# Patient Record
Sex: FEMALE | Race: WHITE | NOT HISPANIC OR LATINO | Employment: OTHER | URBAN - METROPOLITAN AREA
[De-identification: names, ages, dates, MRNs, and addresses within clinical notes are randomized per-mention and may not be internally consistent; named-entity substitution may affect disease eponyms.]

---

## 2017-11-20 ENCOUNTER — HOSPITAL ENCOUNTER (OUTPATIENT)
Dept: FAMILY MEDICINE CLINIC | Facility: CLINIC | Age: 47
Setting detail: SPECIMEN
Discharge: HOME | End: 2017-11-20
Attending: FAMILY MEDICINE | Admitting: FAMILY MEDICINE

## 2017-11-20 LAB
ALBUMIN SERPL-MCNC: 4.5 G/DL (ref 3.5–4.8)
ALBUMIN/GLOB SERPL: 1.9 {RATIO} (ref 1–1.7)
ALP SERPL-CCNC: 47 IU/L (ref 32–91)
ALT SERPL-CCNC: 40 IU/L (ref 14–54)
ANION GAP SERPL CALC-SCNC: 10.6 MMOL/L (ref 10–20)
AST SERPL-CCNC: 23 IU/L (ref 15–41)
BILIRUB SERPL-MCNC: 0.8 MG/DL (ref 0.3–1.2)
BUN SERPL-MCNC: 15 MG/DL (ref 8–20)
BUN/CREAT SERPL: 21.4 (ref 5.4–26.2)
CALCIUM SERPL-MCNC: 9.8 MG/DL (ref 8.9–10.3)
CHLORIDE SERPL-SCNC: 104 MMOL/L (ref 101–111)
CHOLEST SERPL-MCNC: 162 MG/DL
CHOLEST/HDLC SERPL: 3.5 {RATIO}
CONV CO2: 27 MMOL/L (ref 22–32)
CONV LDL CHOLESTEROL DIRECT: 96 MG/DL (ref 0–100)
CONV TOTAL PROTEIN: 6.9 G/DL (ref 6.1–7.9)
CREAT UR-MCNC: 0.7 MG/DL (ref 0.4–1)
GLOBULIN UR ELPH-MCNC: 2.4 G/DL (ref 2.5–3.8)
GLUCOSE SERPL-MCNC: 96 MG/DL (ref 65–99)
HDLC SERPL-MCNC: 47 MG/DL
LDLC/HDLC SERPL: 2.1 {RATIO}
LIPID INTERPRETATION: NORMAL
POTASSIUM SERPL-SCNC: 4.6 MMOL/L (ref 3.6–5.1)
SODIUM SERPL-SCNC: 137 MMOL/L (ref 136–144)
TRIGL SERPL-MCNC: 99 MG/DL
TSH SERPL-ACNC: 1.75 UIU/ML (ref 0.34–5.6)
VLDLC SERPL CALC-MCNC: 19.4 MG/DL

## 2018-09-24 ENCOUNTER — HOSPITAL ENCOUNTER (OUTPATIENT)
Dept: FAMILY MEDICINE CLINIC | Facility: CLINIC | Age: 48
Setting detail: SPECIMEN
Discharge: HOME | End: 2018-09-24
Attending: FAMILY MEDICINE | Admitting: FAMILY MEDICINE

## 2018-09-24 LAB
ALBUMIN SERPL-MCNC: 4.1 G/DL (ref 3.5–4.8)
ALBUMIN/GLOB SERPL: 2 {RATIO} (ref 1–1.7)
ALP SERPL-CCNC: 39 IU/L (ref 32–91)
ALT SERPL-CCNC: 33 IU/L (ref 14–54)
ANION GAP SERPL CALC-SCNC: 8.5 MMOL/L (ref 10–20)
AST SERPL-CCNC: 21 IU/L (ref 15–41)
BASOPHILS # BLD AUTO: 0 10*3/UL (ref 0–0.2)
BASOPHILS NFR BLD AUTO: 1 % (ref 0–2)
BILIRUB SERPL-MCNC: 0.6 MG/DL (ref 0.3–1.2)
BUN SERPL-MCNC: 13 MG/DL (ref 8–20)
BUN/CREAT SERPL: 18.6 (ref 5.4–26.2)
CALCIUM SERPL-MCNC: 8.6 MG/DL (ref 8.9–10.3)
CHLORIDE SERPL-SCNC: 103 MMOL/L (ref 101–111)
CHOLEST SERPL-MCNC: 144 MG/DL
CHOLEST/HDLC SERPL: 3.3 {RATIO}
CONV CO2: 27 MMOL/L (ref 22–32)
CONV LDL CHOLESTEROL DIRECT: 90 MG/DL (ref 0–100)
CONV TOTAL PROTEIN: 6.2 G/DL (ref 6.1–7.9)
CREAT UR-MCNC: 0.7 MG/DL (ref 0.4–1)
DIFFERENTIAL METHOD BLD: (no result)
EOSINOPHIL # BLD AUTO: 0.1 10*3/UL (ref 0–0.3)
EOSINOPHIL # BLD AUTO: 2 % (ref 0–3)
ERYTHROCYTE [DISTWIDTH] IN BLOOD BY AUTOMATED COUNT: 12.5 % (ref 11.5–14.5)
GLOBULIN UR ELPH-MCNC: 2.1 G/DL (ref 2.5–3.8)
GLUCOSE SERPL-MCNC: 85 MG/DL (ref 65–99)
HCT VFR BLD AUTO: 41 % (ref 35–49)
HDLC SERPL-MCNC: 43 MG/DL
HGB BLD-MCNC: 13.8 G/DL (ref 12–15)
LDLC/HDLC SERPL: 2.1 {RATIO}
LIPID INTERPRETATION: NORMAL
LYMPHOCYTES # BLD AUTO: 1.2 10*3/UL (ref 0.8–4.8)
LYMPHOCYTES NFR BLD AUTO: 22 % (ref 18–42)
MCH RBC QN AUTO: 30.3 PG (ref 26–32)
MCHC RBC AUTO-ENTMCNC: 33.7 G/DL (ref 32–36)
MCV RBC AUTO: 89.8 FL (ref 80–94)
MONOCYTES # BLD AUTO: 0.3 10*3/UL (ref 0.1–1.3)
MONOCYTES NFR BLD AUTO: 6 % (ref 2–11)
NEUTROPHILS # BLD AUTO: 3.9 10*3/UL (ref 2.3–8.6)
NEUTROPHILS NFR BLD AUTO: 69 % (ref 50–75)
NRBC BLD AUTO-RTO: 0 /100{WBCS}
NRBC/RBC NFR BLD MANUAL: 0 10*3/UL
PLATELET # BLD AUTO: 298 10*3/UL (ref 150–450)
PMV BLD AUTO: 7.6 FL (ref 7.4–10.4)
POTASSIUM SERPL-SCNC: 3.5 MMOL/L (ref 3.6–5.1)
RBC # BLD AUTO: 4.56 10*6/UL (ref 4–5.4)
SODIUM SERPL-SCNC: 135 MMOL/L (ref 136–144)
TRIGL SERPL-MCNC: 102 MG/DL
VLDLC SERPL CALC-MCNC: 11 MG/DL
WBC # BLD AUTO: 5.6 10*3/UL (ref 4.5–11.5)

## 2019-09-25 RX ORDER — SUCRALFATE 1 G/1
TABLET ORAL
Qty: 120 TABLET | Refills: 0 | Status: SHIPPED | OUTPATIENT
Start: 2019-09-25 | End: 2019-10-23 | Stop reason: SDUPTHER

## 2019-09-26 ENCOUNTER — OFFICE VISIT (OUTPATIENT)
Dept: FAMILY MEDICINE CLINIC | Facility: CLINIC | Age: 49
End: 2019-09-26

## 2019-09-26 ENCOUNTER — LAB (OUTPATIENT)
Dept: FAMILY MEDICINE CLINIC | Facility: CLINIC | Age: 49
End: 2019-09-26

## 2019-09-26 VITALS
BODY MASS INDEX: 29.49 KG/M2 | SYSTOLIC BLOOD PRESSURE: 135 MMHG | DIASTOLIC BLOOD PRESSURE: 81 MMHG | HEART RATE: 68 BPM | WEIGHT: 177 LBS | OXYGEN SATURATION: 100 % | TEMPERATURE: 98.6 F | HEIGHT: 65 IN

## 2019-09-26 DIAGNOSIS — Z23 NEED FOR VACCINATION: ICD-10-CM

## 2019-09-26 DIAGNOSIS — Z00.00 PREVENTATIVE HEALTH CARE: ICD-10-CM

## 2019-09-26 DIAGNOSIS — Z12.11 SCREENING FOR COLON CANCER: ICD-10-CM

## 2019-09-26 DIAGNOSIS — Z00.00 PREVENTATIVE HEALTH CARE: Primary | ICD-10-CM

## 2019-09-26 PROBLEM — F41.9 ANXIETY: Status: ACTIVE | Noted: 2017-11-20

## 2019-09-26 PROBLEM — R63.5 ABNORMAL WEIGHT GAIN: Status: ACTIVE | Noted: 2017-11-20

## 2019-09-26 PROBLEM — R10.9 ABDOMINAL PAIN: Status: ACTIVE | Noted: 2018-04-26

## 2019-09-26 LAB
ALBUMIN SERPL-MCNC: 4.1 G/DL (ref 3.5–4.8)
ALBUMIN/GLOB SERPL: 1.8 G/DL (ref 1–1.7)
ALP SERPL-CCNC: 39 U/L (ref 32–91)
ALT SERPL W P-5'-P-CCNC: 23 U/L (ref 14–54)
ANION GAP SERPL CALCULATED.3IONS-SCNC: 11.8 MMOL/L (ref 5–15)
ARTICHOKE IGE QN: 98 MG/DL (ref 0–100)
AST SERPL-CCNC: 19 U/L (ref 15–41)
BILIRUB SERPL-MCNC: 1 MG/DL (ref 0.3–1.2)
BUN BLD-MCNC: 11 MG/DL (ref 8–20)
BUN/CREAT SERPL: 15.7 (ref 5.4–26.2)
CALCIUM SPEC-SCNC: 8.7 MG/DL (ref 8.9–10.3)
CHLORIDE SERPL-SCNC: 102 MMOL/L (ref 101–111)
CHOLEST SERPL-MCNC: 150 MG/DL
CO2 SERPL-SCNC: 26 MMOL/L (ref 22–32)
CREAT BLD-MCNC: 0.7 MG/DL (ref 0.4–1)
GFR SERPL CREATININE-BSD FRML MDRD: 89 ML/MIN/1.73
GLOBULIN UR ELPH-MCNC: 2.3 GM/DL (ref 2.5–3.8)
GLUCOSE BLD-MCNC: 86 MG/DL (ref 65–99)
HDLC SERPL QL: 3.41
HDLC SERPL-MCNC: 44 MG/DL
LDLC/HDLC SERPL: 1.95 {RATIO}
POTASSIUM BLD-SCNC: 3.8 MMOL/L (ref 3.6–5.1)
PROT SERPL-MCNC: 6.4 G/DL (ref 6.1–7.9)
SODIUM BLD-SCNC: 136 MMOL/L (ref 136–144)
TRIGL SERPL-MCNC: 100 MG/DL
TSH SERPL DL<=0.05 MIU/L-ACNC: 1.8 UIU/ML (ref 0.34–5.6)
VLDLC SERPL-MCNC: 20 MG/DL

## 2019-09-26 PROCEDURE — 99396 PREV VISIT EST AGE 40-64: CPT | Performed by: FAMILY MEDICINE

## 2019-09-26 PROCEDURE — 80061 LIPID PANEL: CPT | Performed by: FAMILY MEDICINE

## 2019-09-26 PROCEDURE — 84443 ASSAY THYROID STIM HORMONE: CPT | Performed by: FAMILY MEDICINE

## 2019-09-26 PROCEDURE — 90674 CCIIV4 VAC NO PRSV 0.5 ML IM: CPT | Performed by: FAMILY MEDICINE

## 2019-09-26 PROCEDURE — 90471 IMMUNIZATION ADMIN: CPT | Performed by: FAMILY MEDICINE

## 2019-09-26 PROCEDURE — 36415 COLL VENOUS BLD VENIPUNCTURE: CPT | Performed by: FAMILY MEDICINE

## 2019-09-26 PROCEDURE — 80053 COMPREHEN METABOLIC PANEL: CPT | Performed by: FAMILY MEDICINE

## 2019-09-26 RX ORDER — ANTIARTHRITIC COMBINATION NO.2 900 MG
TABLET ORAL
COMMUNITY
Start: 2013-03-13 | End: 2021-03-04

## 2019-09-26 RX ORDER — BUPROPION HYDROCHLORIDE 300 MG/1
300 TABLET ORAL DAILY
Qty: 90 TABLET | Refills: 3 | Status: SHIPPED | OUTPATIENT
Start: 2019-09-26 | End: 2020-09-30

## 2019-09-26 RX ORDER — OMEPRAZOLE 20 MG/1
20 CAPSULE, DELAYED RELEASE ORAL EVERY 24 HOURS
Qty: 90 CAPSULE | Refills: 3 | Status: SHIPPED | OUTPATIENT
Start: 2019-09-26 | End: 2019-11-06 | Stop reason: SDUPTHER

## 2019-09-26 RX ORDER — OMEPRAZOLE 20 MG/1
1 CAPSULE, DELAYED RELEASE ORAL EVERY 24 HOURS
COMMUNITY
Start: 2018-07-23 | End: 2019-09-26 | Stop reason: SDUPTHER

## 2019-09-26 RX ORDER — CETIRIZINE HYDROCHLORIDE 10 MG/1
TABLET ORAL
COMMUNITY
Start: 2012-04-12 | End: 2021-03-04 | Stop reason: SDUPTHER

## 2019-09-26 NOTE — PROGRESS NOTES
Subjective   Opal Morales is a 49 y.o. female.     Here for cpe  Youngest son left for college  She feels depressed  Wants to go back to work   gave her a puppy  Parents are having health issues  Sleep is disturbed  Wakes anxious  gerd has flared  Sees a gyn for her pap smears and mammogram  Father had Stage 1 colon cancer - pt is due colonoscopy  Wants flu shot  tdap w/in 10 years         The following portions of the patient's history were reviewed and updated as appropriate: allergies, current medications, past family history, past medical history, past social history, past surgical history and problem list.  Past Medical History:   Diagnosis Date   • SVT (supraventricular tachycardia) (CMS/HCC)      History reviewed. No pertinent surgical history.  Family History   Problem Relation Age of Onset   • Hypertension Other    • Liver disease Other      Social History     Socioeconomic History   • Marital status:      Spouse name: Not on file   • Number of children: Not on file   • Years of education: Not on file   • Highest education level: Not on file   Tobacco Use   • Smoking status: Never Smoker   Substance and Sexual Activity   • Alcohol use: Yes   • Drug use: No         Current Outpatient Medications:   •  cetirizine (zyrTEC) 10 MG tablet, CETIRIZINE HCL 10 MG TABS, Disp: , Rfl:   •  levonorgestrel (MIRENA, 52 MG,) 20 MCG/24HR IUD, MIRENA (52 MG) 20 MCG/24HR IUD, Disp: , Rfl:   •  Multiple Vitamins-Minerals (WOMENS MULTI) capsule, WOMENS MULTI CAPS, Disp: , Rfl:   •  omeprazole (priLOSEC) 20 MG capsule, Take 1 capsule by mouth Daily., Disp: 90 capsule, Rfl: 3  •  buPROPion XL (WELLBUTRIN XL) 300 MG 24 hr tablet, Take 1 tablet by mouth Daily., Disp: 90 tablet, Rfl: 3  •  sucralfate (CARAFATE) 1 g tablet, TAKE 1 TABLET BY MOUTH DAILY BEFORE MEALS AND AT BEDTIME, Disp: 120 tablet, Rfl: 0  No current facility-administered medications for this visit.     Review of Systems   Constitutional: Positive for  "fatigue.   HENT: Negative.    Eyes: Negative.    Respiratory: Negative.    Cardiovascular: Negative.    Gastrointestinal: Negative.    Endocrine: Negative.    Genitourinary: Negative.    Musculoskeletal: Negative.    Skin: Negative.    Allergic/Immunologic: Negative.    Neurological: Negative.    Hematological: Negative.    Psychiatric/Behavioral: Positive for sleep disturbance, depressed mood and stress. Negative for agitation, behavioral problems, decreased concentration, dysphoric mood, hallucinations, self-injury, suicidal ideas and negative for hyperactivity. The patient is nervous/anxious.      /81 (BP Location: Left arm, Patient Position: Sitting, Cuff Size: Adult)   Pulse 68   Temp 98.6 °F (37 °C) (Oral)   Ht 165.1 cm (65\")   Wt 80.3 kg (177 lb)   SpO2 100%   BMI 29.45 kg/m²       Objective   Physical Exam   Constitutional: She is oriented to person, place, and time. Vital signs are normal. She appears well-developed and well-nourished. No distress.   HENT:   Head: Normocephalic and atraumatic.   Right Ear: Hearing, tympanic membrane, external ear and ear canal normal.   Left Ear: Hearing, tympanic membrane, external ear and ear canal normal.   Nose: Nose normal.   Mouth/Throat: Uvula is midline, oropharynx is clear and moist and mucous membranes are normal.   Eyes: Conjunctivae, EOM and lids are normal. Pupils are equal, round, and reactive to light.   Neck: Trachea normal, normal range of motion, full passive range of motion without pain and phonation normal. Neck supple. No JVD present. Carotid bruit is not present. No thyromegaly present.   Cardiovascular: Normal rate, regular rhythm, normal heart sounds, intact distal pulses and normal pulses. Exam reveals no gallop and no friction rub.   No murmur heard.  Pulmonary/Chest: Effort normal and breath sounds normal. No respiratory distress. She has no wheezes. She has no rales. Right breast exhibits no inverted nipple, no mass, no nipple " discharge, no skin change and no tenderness. Left breast exhibits no inverted nipple, no mass, no nipple discharge, no skin change and no tenderness. Breasts are symmetrical. There is no breast swelling.   Abdominal: Soft. Normal appearance and bowel sounds are normal. She exhibits no distension and no mass. There is no hepatosplenomegaly. There is no tenderness. There is no rebound and no guarding. No hernia.   Musculoskeletal: She exhibits no edema.   Lymphadenopathy:     She has no cervical adenopathy.     She has no axillary adenopathy.   Neurological: She is alert and oriented to person, place, and time. She has normal strength and normal reflexes. She displays normal reflexes. No cranial nerve deficit or sensory deficit. She exhibits normal muscle tone. Coordination normal.   Skin: Skin is warm, dry and intact. Capillary refill takes less than 2 seconds. Turgor is normal. No rash noted.   Psychiatric: Her speech is normal and behavior is normal. Judgment and thought content normal. Cognition and memory are normal. She exhibits a depressed mood.   Tearful at times   Nursing note and vitals reviewed.        Assessment/Plan   Problems Addressed this Visit     None      Visit Diagnoses     Preventative health care    -  Primary    encouraged weight loss  restart omeprazole for her gerd  refill wellbutrin for her anxiety/depression    Relevant Orders    Comprehensive Metabolic Panel (Completed)    Lipid Panel (Completed)    TSH (Completed)    Screening for colon cancer        schedule colonoscopy    Relevant Orders    Ambulatory Referral For Screening Colonoscopy    Need for vaccination        flu    Relevant Medications    Influenza Vac Subunit Quad (FLUCELVAX) injection 0.5 mL (Completed)

## 2019-10-23 RX ORDER — SUCRALFATE 1 G/1
TABLET ORAL
Qty: 360 TABLET | Refills: 0 | Status: SHIPPED | OUTPATIENT
Start: 2019-10-23 | End: 2021-03-04

## 2019-11-06 RX ORDER — OMEPRAZOLE 20 MG/1
CAPSULE, DELAYED RELEASE ORAL
Qty: 90 CAPSULE | Refills: 0 | Status: SHIPPED | OUTPATIENT
Start: 2019-11-06 | End: 2020-02-14

## 2020-02-14 RX ORDER — OMEPRAZOLE 20 MG/1
CAPSULE, DELAYED RELEASE ORAL
Qty: 90 CAPSULE | Refills: 1 | Status: SHIPPED | OUTPATIENT
Start: 2020-02-14 | End: 2021-04-14

## 2020-09-11 ENCOUNTER — TELEPHONE (OUTPATIENT)
Dept: FAMILY MEDICINE CLINIC | Facility: CLINIC | Age: 50
End: 2020-09-11

## 2020-09-11 RX ORDER — CYCLOBENZAPRINE HCL 10 MG
10 TABLET ORAL 3 TIMES DAILY PRN
Qty: 30 TABLET | Refills: 0 | Status: SHIPPED | OUTPATIENT
Start: 2020-09-11 | End: 2021-03-04

## 2020-09-11 NOTE — TELEPHONE ENCOUNTER
Pt called. She climbed on the railing on her deck and thinks she mulled a muscle. Asking if she can have a refill on cyclobenzaprine that she was giving in the past.   If yes, [facility] [address].   She has tried taking ibuprofen and it does not help.

## 2020-09-30 RX ORDER — BUPROPION HYDROCHLORIDE 300 MG/1
300 TABLET ORAL DAILY
Qty: 90 TABLET | Refills: 0 | Status: SHIPPED | OUTPATIENT
Start: 2020-09-30 | End: 2020-12-20

## 2020-10-16 RX ORDER — SUCRALFATE 1 G/1
TABLET ORAL
Qty: 360 TABLET | Refills: 0 | OUTPATIENT
Start: 2020-10-16

## 2020-12-20 RX ORDER — BUPROPION HYDROCHLORIDE 300 MG/1
TABLET ORAL
Qty: 90 TABLET | Refills: 0 | Status: SHIPPED | OUTPATIENT
Start: 2020-12-20 | End: 2021-01-23

## 2021-01-11 RX ORDER — SUCRALFATE 1 G/1
TABLET ORAL
Qty: 360 TABLET | Refills: 0 | OUTPATIENT
Start: 2021-01-11

## 2021-01-23 RX ORDER — BUPROPION HYDROCHLORIDE 300 MG/1
TABLET ORAL
Qty: 30 TABLET | Refills: 0 | Status: SHIPPED | OUTPATIENT
Start: 2021-01-23 | End: 2021-04-01

## 2021-01-25 NOTE — TELEPHONE ENCOUNTER
Pt agreeable to making apt. She is not home at the moment and said that she will call back to schedule.

## 2021-03-04 ENCOUNTER — OFFICE VISIT (OUTPATIENT)
Dept: FAMILY MEDICINE CLINIC | Facility: CLINIC | Age: 51
End: 2021-03-04

## 2021-03-04 VITALS
SYSTOLIC BLOOD PRESSURE: 135 MMHG | TEMPERATURE: 98.6 F | HEART RATE: 77 BPM | DIASTOLIC BLOOD PRESSURE: 88 MMHG | WEIGHT: 182 LBS | BODY MASS INDEX: 30.32 KG/M2 | HEIGHT: 65 IN | OXYGEN SATURATION: 98 %

## 2021-03-04 DIAGNOSIS — K21.9 GASTROESOPHAGEAL REFLUX DISEASE, UNSPECIFIED WHETHER ESOPHAGITIS PRESENT: ICD-10-CM

## 2021-03-04 DIAGNOSIS — Z12.11 SCREENING FOR COLON CANCER: ICD-10-CM

## 2021-03-04 DIAGNOSIS — G43.909 MIGRAINE WITHOUT STATUS MIGRAINOSUS, NOT INTRACTABLE, UNSPECIFIED MIGRAINE TYPE: ICD-10-CM

## 2021-03-04 DIAGNOSIS — G44.209 TENSION HEADACHE: Primary | ICD-10-CM

## 2021-03-04 PROCEDURE — 99214 OFFICE O/P EST MOD 30 MIN: CPT | Performed by: FAMILY MEDICINE

## 2021-03-04 RX ORDER — FLUTICASONE PROPIONATE 50 MCG
SPRAY, SUSPENSION (ML) NASAL
Qty: 48 G | Refills: 3 | Status: SHIPPED | OUTPATIENT
Start: 2021-03-04 | End: 2022-03-09

## 2021-03-04 RX ORDER — CETIRIZINE HYDROCHLORIDE 10 MG/1
10 TABLET ORAL DAILY
Qty: 90 TABLET | Refills: 3 | Status: SHIPPED | OUTPATIENT
Start: 2021-03-04 | End: 2022-03-06

## 2021-03-04 RX ORDER — TIZANIDINE HYDROCHLORIDE 4 MG/1
4 CAPSULE, GELATIN COATED ORAL 3 TIMES DAILY PRN
Qty: 30 CAPSULE | Refills: 0 | Status: SHIPPED | OUTPATIENT
Start: 2021-03-04 | End: 2021-03-11 | Stop reason: SINTOL

## 2021-03-04 RX ORDER — SUMATRIPTAN 100 MG/1
TABLET, FILM COATED ORAL
Qty: 9 TABLET | Refills: 11 | Status: SHIPPED | OUTPATIENT
Start: 2021-03-04

## 2021-03-04 RX ORDER — FLUTICASONE PROPIONATE 50 MCG
2 SPRAY, SUSPENSION (ML) NASAL DAILY
Qty: 18.2 ML | Refills: 11 | Status: SHIPPED | OUTPATIENT
Start: 2021-03-04 | End: 2021-03-04

## 2021-03-04 NOTE — PROGRESS NOTES
Subjective   Opal Morales is a 50 y.o. female.     Here for follow up on medications  Needs refills on her meds for her stomach  Has been having problems with headaches and is wondering if it is because she is becoming perimenopausal - (sees gyn)  Prev had headaches 1-2 times a year but these are increasing in freq and severity  freq increasing  Nausea  Photophobia  Entire head  Pain behind eyes  Phonophobia  Will last 24 hours  She has tried a muscle relaxer which seems to help  Last HA was a week ago  10/10  Constant pain  Dizzy         The following portions of the patient's history were reviewed and updated as appropriate: allergies, current medications, past family history, past medical history, past social history, past surgical history and problem list.  Past Medical History:   Diagnosis Date   • SVT (supraventricular tachycardia) (CMS/HCC)      History reviewed. No pertinent surgical history.  Family History   Problem Relation Age of Onset   • Hypertension Other    • Liver disease Other      Social History     Socioeconomic History   • Marital status:      Spouse name: Not on file   • Number of children: Not on file   • Years of education: Not on file   • Highest education level: Not on file   Tobacco Use   • Smoking status: Never Smoker   • Smokeless tobacco: Never Used   Substance and Sexual Activity   • Alcohol use: Yes   • Drug use: No         Current Outpatient Medications:   •  buPROPion XL (WELLBUTRIN XL) 300 MG 24 hr tablet, TAKE 1 TABLET BY MOUTH DAILY, Disp: 30 tablet, Rfl: 0  •  cetirizine (zyrTEC) 10 MG tablet, Take 1 tablet by mouth Daily., Disp: 90 tablet, Rfl: 3  •  levonorgestrel (MIRENA, 52 MG,) 20 MCG/24HR IUD, MIRENA (52 MG) 20 MCG/24HR IUD, Disp: , Rfl:   •  omeprazole (priLOSEC) 20 MG capsule, TAKE ONE CAPSULE BY MOUTH DAILY, Disp: 90 capsule, Rfl: 1  •  fluticasone (FLONASE) 50 MCG/ACT nasal spray, INSTILL 2 SPRAYS INTO THE NOSTRIL AS DIRECTE BY PROVIDER DAILY, Disp: 48 g, Rfl:  "3  •  SUMAtriptan (Imitrex) 100 MG tablet, Take one tablet at onset of headache. May repeat dose one time in 2 hours if headache not relieved., Disp: 9 tablet, Rfl: 11  •  TiZANidine (ZANAFLEX) 4 MG capsule, Take 1 capsule by mouth 3 (Three) Times a Day As Needed for Muscle Spasms., Disp: 30 capsule, Rfl: 0    Review of Systems   Constitutional: Negative.    HENT: Positive for congestion and rhinorrhea. Negative for dental problem, ear pain, facial swelling, sinus pressure and swollen glands.    Eyes: Positive for photophobia. Negative for pain, discharge and itching.   Respiratory: Negative for cough, chest tightness, shortness of breath and wheezing.    Cardiovascular: Negative.    Gastrointestinal: Positive for nausea. Negative for vomiting.   Skin: Negative for rash.   Neurological: Positive for dizziness and headache. Negative for tremors, syncope and light-headedness.   Psychiatric/Behavioral: Positive for stress. Negative for self-injury.     /88 (BP Location: Left arm, Patient Position: Sitting, Cuff Size: Large Adult)   Pulse 77   Temp 98.6 °F (37 °C) (Temporal)   Ht 165.1 cm (65\")   Wt 82.6 kg (182 lb)   SpO2 98%   BMI 30.29 kg/m²       Objective   Physical Exam  Vitals signs and nursing note reviewed.   Constitutional:       General: She is not in acute distress.     Appearance: Normal appearance. She is well-developed. She is obese.   HENT:      Head: Normocephalic and atraumatic.      Right Ear: Tympanic membrane, ear canal and external ear normal.      Left Ear: Tympanic membrane, ear canal and external ear normal.      Nose: Nose normal.   Eyes:      Extraocular Movements: Extraocular movements intact.      Conjunctiva/sclera: Conjunctivae normal.      Pupils: Pupils are equal, round, and reactive to light.   Neck:      Musculoskeletal: Neck supple.      Thyroid: No thyromegaly.   Cardiovascular:      Rate and Rhythm: Normal rate and regular rhythm.      Heart sounds: Normal heart sounds. " No murmur. No friction rub. No gallop.    Pulmonary:      Effort: Pulmonary effort is normal. No respiratory distress.      Breath sounds: Normal breath sounds. No wheezing or rales.   Musculoskeletal:      Right lower leg: No edema.      Left lower leg: No edema.   Lymphadenopathy:      Cervical: No cervical adenopathy.   Skin:     General: Skin is warm and dry.      Findings: No rash.   Neurological:      General: No focal deficit present.      Mental Status: She is alert.      Cranial Nerves: Cranial nerves are intact.   Psychiatric:         Mood and Affect: Mood is anxious.         Behavior: Behavior is cooperative.           Assessment/Plan   Problems Addressed this Visit        Gastrointestinal Abdominal     Gastroesophageal reflux disease      Other Visit Diagnoses     Tension headache    -  Primary    Relevant Medications    TiZANidine (ZANAFLEX) 4 MG capsule    SUMAtriptan (Imitrex) 100 MG tablet    Migraine without status migrainosus, not intractable, unspecified migraine type        Relevant Medications    TiZANidine (ZANAFLEX) 4 MG capsule    SUMAtriptan (Imitrex) 100 MG tablet    Screening for colon cancer        Relevant Orders    Ambulatory Referral For Screening Colonoscopy      Diagnoses       Codes Comments    Tension headache    -  Primary ICD-10-CM: G44.209  ICD-9-CM: 307.81     Migraine without status migrainosus, not intractable, unspecified migraine type     ICD-10-CM: G43.909  ICD-9-CM: 346.90     Screening for colon cancer     ICD-10-CM: Z12.11  ICD-9-CM: V76.51     Gastroesophageal reflux disease, unspecified whether esophagitis present     ICD-10-CM: K21.9  ICD-9-CM: 530.81         Feel her headaches are multifactorial  Feel there is a component of tension and migraine  Will start her on zanaflex and imitrex  Encouraged stress reduction  If headaches persist or worsen, will CT her head  gerd has improved  She is doing well off meds and will stay off them   If symptoms return she will let  me know

## 2021-03-04 NOTE — PATIENT INSTRUCTIONS
Keep working to lose weight through healthy eating and exercise.  Hot showers and warm compresses to neck and shoulders.  Try taking the muscle relaxant at bedtime for a week  I sent meds to try at the onset of one of these big headaches

## 2021-03-11 ENCOUNTER — TELEPHONE (OUTPATIENT)
Dept: FAMILY MEDICINE CLINIC | Facility: CLINIC | Age: 51
End: 2021-03-11

## 2021-03-11 RX ORDER — CYCLOBENZAPRINE HCL 10 MG
10 TABLET ORAL 3 TIMES DAILY PRN
Qty: 30 TABLET | Refills: 0 | Status: SHIPPED | OUTPATIENT
Start: 2021-03-11 | End: 2022-02-13

## 2021-03-11 NOTE — TELEPHONE ENCOUNTER
I sent a new rx to the pharmacy  (before she fills it she can always try taking 1/2 pill of the tizanidine if she wants)

## 2021-03-11 NOTE — TELEPHONE ENCOUNTER
PATIENT IS ASKING FOR A CALL BACK FROM DR BORJAS ASSISTANT. MUSCLE RELAXERS SHE WAS GIVEN ARE MAKING HER GROGGY UNTIL NOON THE NEXT DAY ( TIZANIDINE ) . SHE STATES SHE CAN TAKE FLEXIRILL SO COULD YOU CHANGE IT TO THAT OR SOMETHING ELSE OR A SMALLER DOSE.    PLEASE ADVISE  [phone]     [id number] - [place], [place] - [address] AT SEC OF [address] [facility] - [phone]  - [phone] FX

## 2021-04-01 RX ORDER — BUPROPION HYDROCHLORIDE 300 MG/1
TABLET ORAL
Qty: 90 TABLET | Refills: 3 | Status: SHIPPED | OUTPATIENT
Start: 2021-04-01 | End: 2021-11-02 | Stop reason: SDUPTHER

## 2021-04-14 RX ORDER — OMEPRAZOLE 20 MG/1
CAPSULE, DELAYED RELEASE ORAL
Qty: 90 CAPSULE | Refills: 1 | Status: SHIPPED | OUTPATIENT
Start: 2021-04-14 | End: 2021-11-02 | Stop reason: SDUPTHER

## 2021-07-10 PROCEDURE — 87086 URINE CULTURE/COLONY COUNT: CPT | Performed by: NURSE PRACTITIONER

## 2021-11-02 RX ORDER — OMEPRAZOLE 20 MG/1
20 CAPSULE, DELAYED RELEASE ORAL DAILY
Qty: 90 CAPSULE | Refills: 1 | Status: SHIPPED | OUTPATIENT
Start: 2021-11-02 | End: 2022-03-29 | Stop reason: SDUPTHER

## 2021-11-02 RX ORDER — BUPROPION HYDROCHLORIDE 300 MG/1
300 TABLET ORAL DAILY
Qty: 90 TABLET | Refills: 1 | Status: SHIPPED | OUTPATIENT
Start: 2021-11-02 | End: 2022-06-07

## 2021-11-02 NOTE — TELEPHONE ENCOUNTER
PATIENT NEEDS REFILL ON:omeprazole (priLOSEC) 20 MG capsule  buPROPion XL (WELLBUTRIN XL) 300 MG 24 hr tablet     PATIENT CAN BE REACHED ON: [phone]     [facility] [place] - [address] [place] [address] DR - [phone]  - [phone]   [phone]

## 2022-02-13 RX ORDER — CYCLOBENZAPRINE HCL 10 MG
TABLET ORAL
Qty: 30 TABLET | Refills: 0 | Status: SHIPPED | OUTPATIENT
Start: 2022-02-13 | End: 2023-01-13

## 2022-03-05 ENCOUNTER — TELEPHONE (OUTPATIENT)
Dept: FAMILY MEDICINE CLINIC | Facility: CLINIC | Age: 52
End: 2022-03-05

## 2022-03-06 RX ORDER — CETIRIZINE HYDROCHLORIDE 10 MG/1
10 TABLET ORAL DAILY
Qty: 90 TABLET | Refills: 0 | Status: SHIPPED | OUTPATIENT
Start: 2022-03-06 | End: 2022-06-07

## 2022-03-09 RX ORDER — FLUTICASONE PROPIONATE 50 MCG
SPRAY, SUSPENSION (ML) NASAL
Qty: 16 G | Refills: 0 | Status: SHIPPED | OUTPATIENT
Start: 2022-03-09 | End: 2022-03-29 | Stop reason: SDUPTHER

## 2022-03-29 ENCOUNTER — OFFICE VISIT (OUTPATIENT)
Dept: FAMILY MEDICINE CLINIC | Facility: CLINIC | Age: 52
End: 2022-03-29

## 2022-03-29 VITALS
OXYGEN SATURATION: 98 % | TEMPERATURE: 98.6 F | HEART RATE: 79 BPM | WEIGHT: 185 LBS | BODY MASS INDEX: 30.82 KG/M2 | HEIGHT: 65 IN | DIASTOLIC BLOOD PRESSURE: 83 MMHG | SYSTOLIC BLOOD PRESSURE: 131 MMHG

## 2022-03-29 DIAGNOSIS — J30.9 ALLERGIC RHINITIS, UNSPECIFIED SEASONALITY, UNSPECIFIED TRIGGER: ICD-10-CM

## 2022-03-29 DIAGNOSIS — K21.9 GASTROESOPHAGEAL REFLUX DISEASE, UNSPECIFIED WHETHER ESOPHAGITIS PRESENT: Primary | ICD-10-CM

## 2022-03-29 DIAGNOSIS — Z00.00 HEALTHCARE MAINTENANCE: ICD-10-CM

## 2022-03-29 DIAGNOSIS — Z76.89 ENCOUNTER FOR WEIGHT MANAGEMENT: ICD-10-CM

## 2022-03-29 DIAGNOSIS — F41.9 ANXIETY: ICD-10-CM

## 2022-03-29 DIAGNOSIS — Z12.11 SCREENING FOR COLON CANCER: ICD-10-CM

## 2022-03-29 PROBLEM — R63.5 ABNORMAL WEIGHT GAIN: Status: RESOLVED | Noted: 2017-11-20 | Resolved: 2022-03-29

## 2022-03-29 PROBLEM — R10.9 ABDOMINAL PAIN: Status: RESOLVED | Noted: 2018-04-26 | Resolved: 2022-03-29

## 2022-03-29 PROCEDURE — 99213 OFFICE O/P EST LOW 20 MIN: CPT | Performed by: FAMILY MEDICINE

## 2022-03-29 RX ORDER — FLUTICASONE PROPIONATE 50 MCG
2 SPRAY, SUSPENSION (ML) NASAL DAILY
Qty: 48 G | Refills: 3 | Status: SHIPPED | OUTPATIENT
Start: 2022-03-29 | End: 2023-01-13

## 2022-03-29 RX ORDER — OMEPRAZOLE 20 MG/1
20 CAPSULE, DELAYED RELEASE ORAL DAILY
Qty: 90 CAPSULE | Refills: 3 | Status: SHIPPED | OUTPATIENT
Start: 2022-03-29 | End: 2023-01-13

## 2022-06-07 RX ORDER — CETIRIZINE HYDROCHLORIDE 10 MG/1
10 TABLET ORAL DAILY
Qty: 90 TABLET | Refills: 2 | Status: SHIPPED | OUTPATIENT
Start: 2022-06-07 | End: 2023-04-05

## 2022-06-07 RX ORDER — BUPROPION HYDROCHLORIDE 300 MG/1
300 TABLET ORAL DAILY
Qty: 90 TABLET | Refills: 1 | Status: SHIPPED | OUTPATIENT
Start: 2022-06-07 | End: 2022-12-05

## 2022-12-05 RX ORDER — BUPROPION HYDROCHLORIDE 300 MG/1
300 TABLET ORAL DAILY
Qty: 90 TABLET | Refills: 0 | Status: SHIPPED | OUTPATIENT
Start: 2022-12-05 | End: 2023-03-05

## 2023-01-13 ENCOUNTER — APPOINTMENT (OUTPATIENT)
Dept: CT IMAGING | Facility: HOSPITAL | Age: 53
DRG: 392 | End: 2023-01-13
Payer: COMMERCIAL

## 2023-01-13 ENCOUNTER — HOSPITAL ENCOUNTER (INPATIENT)
Facility: HOSPITAL | Age: 53
LOS: 3 days | Discharge: HOME | DRG: 392 | End: 2023-01-16
Attending: EMERGENCY MEDICINE | Admitting: INTERNAL MEDICINE
Payer: COMMERCIAL

## 2023-01-13 DIAGNOSIS — K57.80 PERFORATED DIVERTICULUM: ICD-10-CM

## 2023-01-13 DIAGNOSIS — R11.2 NAUSEA AND VOMITING, UNSPECIFIED VOMITING TYPE: ICD-10-CM

## 2023-01-13 DIAGNOSIS — R10.30 LOWER ABDOMINAL PAIN: Primary | ICD-10-CM

## 2023-01-13 DIAGNOSIS — K57.20 DIVERTICULITIS OF COLON WITH PERFORATION: ICD-10-CM

## 2023-01-13 DIAGNOSIS — A41.9 SEPSIS, DUE TO UNSPECIFIED ORGANISM, UNSPECIFIED WHETHER ACUTE ORGAN DYSFUNCTION PRESENT: ICD-10-CM

## 2023-01-13 LAB
ALBUMIN SERPL-MCNC: 4.8 G/DL (ref 3.5–5.2)
ALBUMIN/GLOB SERPL: 1.8 G/DL
ALP SERPL-CCNC: 78 U/L (ref 39–117)
ALT SERPL W P-5'-P-CCNC: 57 U/L (ref 1–33)
ANION GAP SERPL CALCULATED.3IONS-SCNC: 16 MMOL/L (ref 5–15)
AST SERPL-CCNC: 42 U/L (ref 1–32)
B-HCG UR QL: NEGATIVE
BASOPHILS # BLD AUTO: 0 10*3/MM3 (ref 0–0.2)
BASOPHILS NFR BLD AUTO: 0.2 % (ref 0–1.5)
BILIRUB SERPL-MCNC: 0.9 MG/DL (ref 0–1.2)
BUN SERPL-MCNC: 13 MG/DL (ref 6–20)
BUN/CREAT SERPL: 17.6 (ref 7–25)
C TRACH RRNA CVX QL NAA+PROBE: NOT DETECTED
CALCIUM SPEC-SCNC: 9.6 MG/DL (ref 8.6–10.5)
CHLORIDE SERPL-SCNC: 100 MMOL/L (ref 98–107)
CLUE CELLS SPEC QL WET PREP: NORMAL
CO2 SERPL-SCNC: 21 MMOL/L (ref 22–29)
CREAT SERPL-MCNC: 0.74 MG/DL (ref 0.57–1)
D-LACTATE SERPL-SCNC: 2.3 MMOL/L (ref 0.5–2)
D-LACTATE SERPL-SCNC: 3.2 MMOL/L (ref 0.5–2)
DEPRECATED RDW RBC AUTO: 38.1 FL (ref 37–54)
EGFRCR SERPLBLD CKD-EPI 2021: 96.9 ML/MIN/1.73
EOSINOPHIL # BLD AUTO: 0 10*3/MM3 (ref 0–0.4)
EOSINOPHIL NFR BLD AUTO: 0 % (ref 0.3–6.2)
ERYTHROCYTE [DISTWIDTH] IN BLOOD BY AUTOMATED COUNT: 12.2 % (ref 12.3–15.4)
GLOBULIN UR ELPH-MCNC: 2.7 GM/DL
GLUCOSE SERPL-MCNC: 150 MG/DL (ref 65–99)
HCT VFR BLD AUTO: 45.8 % (ref 34–46.6)
HGB BLD-MCNC: 15.3 G/DL (ref 12–15.9)
HOLD SPECIMEN: NORMAL
HOLD SPECIMEN: NORMAL
HYDATID CYST SPEC WET PREP: NORMAL
LIPASE SERPL-CCNC: 25 U/L (ref 13–60)
LYMPHOCYTES # BLD AUTO: 1 10*3/MM3 (ref 0.7–3.1)
LYMPHOCYTES NFR BLD AUTO: 6.7 % (ref 19.6–45.3)
MCH RBC QN AUTO: 29.7 PG (ref 26.6–33)
MCHC RBC AUTO-ENTMCNC: 33.4 G/DL (ref 31.5–35.7)
MCV RBC AUTO: 89 FL (ref 79–97)
MONOCYTES # BLD AUTO: 0.6 10*3/MM3 (ref 0.1–0.9)
MONOCYTES NFR BLD AUTO: 4.3 % (ref 5–12)
N GONORRHOEA RRNA SPEC QL NAA+PROBE: NOT DETECTED
NEUTROPHILS NFR BLD AUTO: 12.9 10*3/MM3 (ref 1.7–7)
NEUTROPHILS NFR BLD AUTO: 88.8 % (ref 42.7–76)
NRBC BLD AUTO-RTO: 0 /100 WBC (ref 0–0.2)
PLATELET # BLD AUTO: 364 10*3/MM3 (ref 140–450)
PMV BLD AUTO: 7.4 FL (ref 6–12)
POTASSIUM SERPL-SCNC: 3.6 MMOL/L (ref 3.5–5.2)
PROT SERPL-MCNC: 7.5 G/DL (ref 6–8.5)
RBC # BLD AUTO: 5.15 10*6/MM3 (ref 3.77–5.28)
SODIUM SERPL-SCNC: 137 MMOL/L (ref 136–145)
T VAGINALIS SPEC QL WET PREP: NORMAL
WBC NRBC COR # BLD: 14.6 10*3/MM3 (ref 3.4–10.8)
WBC SPEC QL WET PREP: NORMAL
WHOLE BLOOD HOLD COAG: NORMAL
WHOLE BLOOD HOLD SPECIMEN: NORMAL
YEAST GENITAL QL WET PREP: NORMAL

## 2023-01-13 PROCEDURE — 87040 BLOOD CULTURE FOR BACTERIA: CPT | Performed by: EMERGENCY MEDICINE

## 2023-01-13 PROCEDURE — 25010000002 PIPERACILLIN SOD-TAZOBACTAM PER 1 G: Performed by: NURSE PRACTITIONER

## 2023-01-13 PROCEDURE — 81025 URINE PREGNANCY TEST: CPT | Performed by: NURSE PRACTITIONER

## 2023-01-13 PROCEDURE — 83690 ASSAY OF LIPASE: CPT | Performed by: NURSE PRACTITIONER

## 2023-01-13 PROCEDURE — 80053 COMPREHEN METABOLIC PANEL: CPT | Performed by: EMERGENCY MEDICINE

## 2023-01-13 PROCEDURE — 25010000002 HYDROMORPHONE 1 MG/ML SOLUTION: Performed by: NURSE PRACTITIONER

## 2023-01-13 PROCEDURE — 99221 1ST HOSP IP/OBS SF/LOW 40: CPT | Performed by: SURGERY

## 2023-01-13 PROCEDURE — 25010000002 ONDANSETRON PER 1 MG: Performed by: NURSE PRACTITIONER

## 2023-01-13 PROCEDURE — 0 IOPAMIDOL PER 1 ML: Performed by: EMERGENCY MEDICINE

## 2023-01-13 PROCEDURE — 87210 SMEAR WET MOUNT SALINE/INK: CPT | Performed by: NURSE PRACTITIONER

## 2023-01-13 PROCEDURE — 87591 N.GONORRHOEAE DNA AMP PROB: CPT | Performed by: NURSE PRACTITIONER

## 2023-01-13 PROCEDURE — 25010000002 METOCLOPRAMIDE PER 10 MG: Performed by: NURSE PRACTITIONER

## 2023-01-13 PROCEDURE — 83605 ASSAY OF LACTIC ACID: CPT

## 2023-01-13 PROCEDURE — 25010000002 DIPHENHYDRAMINE PER 50 MG: Performed by: NURSE PRACTITIONER

## 2023-01-13 PROCEDURE — 99285 EMERGENCY DEPT VISIT HI MDM: CPT

## 2023-01-13 PROCEDURE — 36415 COLL VENOUS BLD VENIPUNCTURE: CPT

## 2023-01-13 PROCEDURE — 74177 CT ABD & PELVIS W/CONTRAST: CPT

## 2023-01-13 PROCEDURE — 25010000002 MORPHINE PER 10 MG: Performed by: NURSE PRACTITIONER

## 2023-01-13 PROCEDURE — 85025 COMPLETE CBC W/AUTO DIFF WBC: CPT | Performed by: EMERGENCY MEDICINE

## 2023-01-13 PROCEDURE — 87491 CHLMYD TRACH DNA AMP PROBE: CPT | Performed by: NURSE PRACTITIONER

## 2023-01-13 RX ORDER — ONDANSETRON 2 MG/ML
4 INJECTION INTRAMUSCULAR; INTRAVENOUS ONCE
Status: COMPLETED | OUTPATIENT
Start: 2023-01-13 | End: 2023-01-13

## 2023-01-13 RX ORDER — SODIUM CHLORIDE 0.9 % (FLUSH) 0.9 %
10 SYRINGE (ML) INJECTION AS NEEDED
Status: DISCONTINUED | OUTPATIENT
Start: 2023-01-13 | End: 2023-01-16 | Stop reason: HOSPADM

## 2023-01-13 RX ORDER — SODIUM CHLORIDE 0.9 % (FLUSH) 0.9 %
10 SYRINGE (ML) INJECTION EVERY 12 HOURS SCHEDULED
Status: DISCONTINUED | OUTPATIENT
Start: 2023-01-13 | End: 2023-01-16 | Stop reason: HOSPADM

## 2023-01-13 RX ORDER — DIPHENHYDRAMINE HYDROCHLORIDE 50 MG/ML
25 INJECTION INTRAMUSCULAR; INTRAVENOUS ONCE
Status: COMPLETED | OUTPATIENT
Start: 2023-01-13 | End: 2023-01-13

## 2023-01-13 RX ORDER — SODIUM CHLORIDE 9 MG/ML
40 INJECTION, SOLUTION INTRAVENOUS AS NEEDED
Status: DISCONTINUED | OUTPATIENT
Start: 2023-01-13 | End: 2023-01-16 | Stop reason: HOSPADM

## 2023-01-13 RX ORDER — SODIUM CHLORIDE, SODIUM LACTATE, POTASSIUM CHLORIDE, CALCIUM CHLORIDE 600; 310; 30; 20 MG/100ML; MG/100ML; MG/100ML; MG/100ML
100 INJECTION, SOLUTION INTRAVENOUS CONTINUOUS
Status: DISCONTINUED | OUTPATIENT
Start: 2023-01-13 | End: 2023-01-15

## 2023-01-13 RX ORDER — MORPHINE SULFATE 2 MG/ML
2 INJECTION, SOLUTION INTRAMUSCULAR; INTRAVENOUS
Status: DISCONTINUED | OUTPATIENT
Start: 2023-01-13 | End: 2023-01-14

## 2023-01-13 RX ORDER — ALUMINA, MAGNESIA, AND SIMETHICONE 2400; 2400; 240 MG/30ML; MG/30ML; MG/30ML
15 SUSPENSION ORAL EVERY 6 HOURS PRN
Status: DISCONTINUED | OUTPATIENT
Start: 2023-01-13 | End: 2023-01-16 | Stop reason: HOSPADM

## 2023-01-13 RX ORDER — ACETAMINOPHEN 650 MG/1
650 SUPPOSITORY RECTAL EVERY 4 HOURS PRN
Status: DISCONTINUED | OUTPATIENT
Start: 2023-01-13 | End: 2023-01-16 | Stop reason: HOSPADM

## 2023-01-13 RX ORDER — ONDANSETRON 4 MG/1
4 TABLET, FILM COATED ORAL EVERY 6 HOURS PRN
Status: DISCONTINUED | OUTPATIENT
Start: 2023-01-13 | End: 2023-01-16 | Stop reason: HOSPADM

## 2023-01-13 RX ORDER — ACETAMINOPHEN 160 MG/5ML
650 SOLUTION ORAL EVERY 4 HOURS PRN
Status: DISCONTINUED | OUTPATIENT
Start: 2023-01-13 | End: 2023-01-16 | Stop reason: HOSPADM

## 2023-01-13 RX ORDER — ONDANSETRON 2 MG/ML
4 INJECTION INTRAMUSCULAR; INTRAVENOUS EVERY 6 HOURS PRN
Status: DISCONTINUED | OUTPATIENT
Start: 2023-01-13 | End: 2023-01-16 | Stop reason: HOSPADM

## 2023-01-13 RX ORDER — POTASSIUM CHLORIDE 7.45 MG/ML
10 INJECTION INTRAVENOUS
Status: DISCONTINUED | OUTPATIENT
Start: 2023-01-13 | End: 2023-01-13

## 2023-01-13 RX ORDER — METOCLOPRAMIDE HYDROCHLORIDE 5 MG/ML
10 INJECTION INTRAMUSCULAR; INTRAVENOUS ONCE
Status: COMPLETED | OUTPATIENT
Start: 2023-01-13 | End: 2023-01-13

## 2023-01-13 RX ORDER — MORPHINE SULFATE 2 MG/ML
2 INJECTION, SOLUTION INTRAMUSCULAR; INTRAVENOUS ONCE
Status: COMPLETED | OUTPATIENT
Start: 2023-01-13 | End: 2023-01-13

## 2023-01-13 RX ORDER — ACETAMINOPHEN 325 MG/1
650 TABLET ORAL EVERY 4 HOURS PRN
Status: DISCONTINUED | OUTPATIENT
Start: 2023-01-13 | End: 2023-01-16 | Stop reason: HOSPADM

## 2023-01-13 RX ORDER — CHOLECALCIFEROL (VITAMIN D3) 125 MCG
5 CAPSULE ORAL NIGHTLY PRN
Status: DISCONTINUED | OUTPATIENT
Start: 2023-01-13 | End: 2023-01-16 | Stop reason: HOSPADM

## 2023-01-13 RX ADMIN — DIPHENHYDRAMINE HYDROCHLORIDE 25 MG: 50 INJECTION, SOLUTION INTRAMUSCULAR; INTRAVENOUS at 10:15

## 2023-01-13 RX ADMIN — PIPERACILLIN AND TAZOBACTAM 3.38 G: 3; .375 INJECTION, POWDER, FOR SOLUTION INTRAVENOUS at 21:51

## 2023-01-13 RX ADMIN — HYDROMORPHONE HYDROCHLORIDE 0.5 MG: 1 INJECTION, SOLUTION INTRAMUSCULAR; INTRAVENOUS; SUBCUTANEOUS at 07:43

## 2023-01-13 RX ADMIN — MORPHINE SULFATE 2 MG: 2 INJECTION, SOLUTION INTRAMUSCULAR; INTRAVENOUS at 14:57

## 2023-01-13 RX ADMIN — METOCLOPRAMIDE 10 MG: 5 INJECTION, SOLUTION INTRAMUSCULAR; INTRAVENOUS at 10:15

## 2023-01-13 RX ADMIN — SODIUM CHLORIDE, POTASSIUM CHLORIDE, SODIUM LACTATE AND CALCIUM CHLORIDE 200 ML/HR: 600; 310; 30; 20 INJECTION, SOLUTION INTRAVENOUS at 21:46

## 2023-01-13 RX ADMIN — ACETAMINOPHEN 650 MG: 325 TABLET, FILM COATED ORAL at 22:09

## 2023-01-13 RX ADMIN — SODIUM CHLORIDE, POTASSIUM CHLORIDE, SODIUM LACTATE AND CALCIUM CHLORIDE 200 ML/HR: 600; 310; 30; 20 INJECTION, SOLUTION INTRAVENOUS at 10:44

## 2023-01-13 RX ADMIN — MORPHINE SULFATE 2 MG: 2 INJECTION, SOLUTION INTRAMUSCULAR; INTRAVENOUS at 10:15

## 2023-01-13 RX ADMIN — ONDANSETRON 4 MG: 2 INJECTION INTRAMUSCULAR; INTRAVENOUS at 07:43

## 2023-01-13 RX ADMIN — SODIUM CHLORIDE, POTASSIUM CHLORIDE, SODIUM LACTATE AND CALCIUM CHLORIDE 1000 ML: 600; 310; 30; 20 INJECTION, SOLUTION INTRAVENOUS at 07:43

## 2023-01-13 RX ADMIN — Medication 10 ML: at 21:46

## 2023-01-13 RX ADMIN — IOPAMIDOL 100 ML: 755 INJECTION, SOLUTION INTRAVENOUS at 09:34

## 2023-01-13 RX ADMIN — PIPERACILLIN AND TAZOBACTAM 3.38 G: 3; .375 INJECTION, POWDER, FOR SOLUTION INTRAVENOUS at 15:32

## 2023-01-13 RX ADMIN — Medication 10 ML: at 15:33

## 2023-01-13 RX ADMIN — ONDANSETRON 4 MG: 2 INJECTION INTRAMUSCULAR; INTRAVENOUS at 15:05

## 2023-01-13 RX ADMIN — PIPERACILLIN AND TAZOBACTAM 3.38 G: 3; .375 INJECTION, POWDER, FOR SOLUTION INTRAVENOUS at 08:24

## 2023-01-13 RX ADMIN — MORPHINE SULFATE 2 MG: 2 INJECTION, SOLUTION INTRAMUSCULAR; INTRAVENOUS at 19:10

## 2023-01-13 NOTE — CONSULTS
GENERAL SURGERY CONSULT    Referring Provider: Long  Reason for Consultation: Diverticulitis    Patient Care Team:  Keturah Harp MD as PCP - General (Family Medicine)    Chief complaint Abdominal pain    Subjective .     History of present illness: 52-year-old lady who presented to the emergency department this morning with complaints of acute onset of lower abdominal pain which began in the early hours of the morning and awoke her from sleep.  Upon presentation to the ER the patient was tachycardic and complaining of fairly severe abdominal pain.  Labs showed a white count elevated to 14,000.  A CT scan was performed showing a small pocket of air adjacent to the sigmoid colon which was inflamed.  There was also a small scattering of air in the upper abdomen.  The patient was given fluids and antibiotics in the emergency department I was asked to see the patient in consultation.     she is accompanied by her .  They note she has no history of prior episodes of diverticulitis.  She has never had a prior colonoscopy.    Review of Systems    Review of Systems   Gastrointestinal: Positive for abdominal pain and vomiting. Negative for constipation and diarrhea.         History  Past Medical History:   Diagnosis Date   • SVT (supraventricular tachycardia) (HCC)    , No past surgical history on file.,   Family History   Problem Relation Age of Onset   • Hypertension Other    • Liver disease Other    ,   Social History     Tobacco Use   • Smoking status: Never   • Smokeless tobacco: Never   Vaping Use   • Vaping Use: Never used   Substance Use Topics   • Alcohol use: Yes   • Drug use: Yes     Types: Marijuana   , (Not in a hospital admission)  , Scheduled Meds:  piperacillin-tazobactam, 3.375 g, Intravenous, Q8H  sodium chloride, 10 mL, Intravenous, Q12H    , Continuous Infusions:  lactated ringers, 200 mL/hr, Last Rate: 200 mL/hr (01/13/23 1534)    , PRN Meds:  •  acetaminophen **OR** acetaminophen  **OR** acetaminophen  •  aluminum-magnesium hydroxide-simethicone  •  melatonin  •  Morphine  •  ondansetron **OR** ondansetron  •  sodium chloride  •  sodium chloride  •  sodium chloride and Allergies:  Codeine    Objective     Vital Signs   Temp:  [97.6 °F (36.4 °C)] 97.6 °F (36.4 °C)  Heart Rate:  [] 114  Resp:  [18-22] 18  BP: (117-139)/(57-83) 131/71    Physical Exam:       General Appearance:    Alert, cooperative, in no acute distress   Head:    Normocephalic, without obvious abnormality, atraumatic   Eyes:            Lids and lashes normal, conjunctivae and sclerae normal, no   icterus, no pallor, corneas clear   Ears:    Ears appear intact with no abnormalities noted   Lungs:     Breathing unlabored   Abdomen:     Soft, no guarding in upper abdomen, no masses or hernias. Tender across lower abdomen, particularly in the LLQ   Extremities:   Moves all extremities well   Skin:   No bleeding, bruising or rash   Neurologic:   Cranial nerves 2 - 12 grossly intact, sensation intact       Results Review:  Lab Results (last 72 hours)     Procedure Component Value Units Date/Time    STAT Lactic Acid, Reflex [[id number]]  (Abnormal) Collected: 01/13/23 1124    Specimen: Blood Updated: 01/13/23 1150     Lactate 3.2 mmol/L     Chlamydia trachomatis, Neisseria gonorrhoeae, PCR - Swab, Urine, Random Void [[id number]]  (Normal) Collected: 01/13/23 0908    Specimen: Swab from Urine, Random Void Updated: 01/13/23 1052     Chlamydia DNA by PCR Not Detected     Neisseria gonorrhoeae by PCR Not Detected    Pregnancy, Urine - Urine, Clean Catch [[id number]]  (Normal) Collected: 01/13/23 0908    Specimen: Urine, Clean Catch Updated: 01/13/23 0919     HCG, Urine QL Negative    Lipase [[id number]]  (Normal) Collected: 01/13/23 0729    Specimen: Blood Updated: 01/13/23 0908     Lipase 25 U/L     Wet Prep, Genital - Swab, Vagina [[id number]]  (Normal) Collected: 01/13/23 0831    Specimen: Swab from Vagina Updated: 01/13/23 0897      YEAST No yeast seen     HYPHAL ELEMENTS No Hyphal elements seen     WBC'S No WBC's seen     Clue Cells, Wet Prep No Clue cells seen     Trichomonas, Wet Prep No Trichomonas seen    [place] Draw [[id number]] Collected: 01/13/23 0729    Specimen: Blood Updated: 01/13/23 0830    Narrative:      The following orders were created for panel order [place] Draw.  Procedure                               Abnormality         Status                     ---------                               -----------         ------                     Green Top (Gel)[[id number]]                                  Final result               Lavender Top[[id number]]                                     Final result               Gold Top - SST[[id number]]                                   Final result               Light Blue Top[[id number]]                                   Final result                 Please view results for these tests on the individual orders.    Green Top (Gel) [[id number]] Collected: 01/13/23 0729    Specimen: Blood Updated: 01/13/23 0830     Extra Tube Hold for add-ons.     Comment: Auto resulted.       Gold Top - SST [[id number]] Collected: 01/13/23 0729    Specimen: Blood Updated: 01/13/23 0830     Extra Tube Hold for add-ons.     Comment: Auto resulted.       Lavender Top [[id number]] Collected: 01/13/23 0729    Specimen: Blood Updated: 01/13/23 0830     Extra Tube hold for add-on     Comment: Auto resulted       Light Blue Top [[id number]] Collected: 01/13/23 0729    Specimen: Blood Updated: 01/13/23 0830     Extra Tube Hold for add-ons.     Comment: Auto resulted       Blood Culture - Blood, Arm, Left [[id number]] Collected: 01/13/23 0814    Specimen: Blood from Arm, Left Updated: 01/13/23 0824    Comprehensive Metabolic Panel [[id number]]  (Abnormal) Collected: 01/13/23 0729    Specimen: Blood Updated: 01/13/23 0808     Glucose 150 mg/dL      BUN 13 mg/dL      Creatinine 0.74 mg/dL      Sodium 137 mmol/L      Potassium 3.6  mmol/L      Comment: Slight hemolysis detected by analyzer. Results may be affected.        Chloride 100 mmol/L      CO2 21.0 mmol/L      Calcium 9.6 mg/dL      Total Protein 7.5 g/dL      Albumin 4.8 g/dL      ALT (SGPT) 57 U/L      AST (SGOT) 42 U/L      Alkaline Phosphatase 78 U/L      Total Bilirubin 0.9 mg/dL      Globulin 2.7 gm/dL      A/G Ratio 1.8 g/dL      BUN/Creatinine Ratio 17.6     Anion Gap 16.0 mmol/L      eGFR 96.9 mL/min/1.73      Comment: National Kidney Foundation and American Society of Nephrology (ASN) Task Force recommended calculation based on the Chronic Kidney Disease Epidemiology Collaboration (CKD-EPI) equation refit without adjustment for race.       Narrative:      GFR Normal >60  Chronic Kidney Disease <60  Kidney Failure <15      CBC & Differential [[id number]]  (Abnormal) Collected: 01/13/23 0729    Specimen: Blood Updated: 01/13/23 0753    Narrative:      The following orders were created for panel order CBC & Differential.  Procedure                               Abnormality         Status                     ---------                               -----------         ------                     CBC Auto Differential[[id number]]        Abnormal            Final result                 Please view results for these tests on the individual orders.    CBC Auto Differential [[id number]]  (Abnormal) Collected: 01/13/23 0729    Specimen: Blood Updated: 01/13/23 0753     WBC 14.60 10*3/mm3      RBC 5.15 10*6/mm3      Hemoglobin 15.3 g/dL      Hematocrit 45.8 %      MCV 89.0 fL      MCH 29.7 pg      MCHC 33.4 g/dL      RDW 12.2 %      RDW-SD 38.1 fl      MPV 7.4 fL      Platelets 364 10*3/mm3      Neutrophil % 88.8 %      Lymphocyte % 6.7 %      Monocyte % 4.3 %      Eosinophil % 0.0 %      Basophil % 0.2 %      Neutrophils, Absolute 12.90 10*3/mm3      Lymphocytes, Absolute 1.00 10*3/mm3      Monocytes, Absolute 0.60 10*3/mm3      Eosinophils, Absolute 0.00 10*3/mm3      Basophils, Absolute  0.00 10*3/mm3      nRBC 0.0 /100 WBC     Blood Culture - Blood, Arm, Right [[id number]] Collected: 01/13/23 0729    Specimen: Blood from Arm, Right Updated: 01/13/23 0743    POC Lactate [[id number]]  (Abnormal) Collected: 01/13/23 0737    Specimen: Blood Updated: 01/13/23 0738     Lactate 2.3 mmol/L      Comment: Serial Number: [id number]:  [id number]           Imaging Results (Last 72 Hours)     Procedure Component Value Units Date/Time    CT Abdomen Pelvis With Contrast [[id number]] Collected: 01/13/23 0936     Updated: 01/13/23 0949    Narrative:      CT ABDOMEN PELVIS W CONTRAST    Date of Exam: 1/13/2023 9:32 AM EST    Indication: lower abdominal pain,bilateral. epigastric pressure.    Comparison: None available.    Technique: Axial CT images were obtained of the abdomen and pelvis following the uneventful intravenous administration of iodinated contrast. Sagittal and coronal reconstructions were performed.  Automated exposure control and iterative reconstruction   methods were used.     Findings:  Focal acute diverticulitis changes are present in the mid sigmoid colon. Small foci of free intraperitoneal air are scattered throughout the abdomen and pelvis, consistent with diverticular perforation. A small air-fluid collection is seen in the midline   of the pelvic mesentery, measuring 1.4 x 1.6 cm, without well-formed walls, and does not appear to represent a well-formed abscess, at this time. There is no indication of upstream bowel obstruction.    The lung bases are free of consolidation. There are some bandlike atelectasis within the right lower lobe. The heart size is normal. Small esophageal hiatal hernia is present.    6 mm low-density lesions within the right hepatic lobe and left hepatic lobe are too small to characterize, statistically favored to represent cysts. The gallbladder, spleen, pancreas, adrenals and kidneys are within normal limits. The urinary bladder   and rectum are normal.  Hysterectomy intrauterine device is in place.. Trace pelvic free fluid is present.    No acute osseous abnormalities are identified. There is moderate diminished disc height at the L5-S1 level. Probable small bone island in the S1 sacral segment and within the T10 vertebral body.      Impression:      1.  Focal acute perforated sigmoid diverticulitis. Small free air within the abdomen pelvis. Small air-fluid pocket in the pelvis which does not appear to represent a well-formed abscess at this time. Trace pelvic free fluid. I notified the emergency   room clinician.        Electronically Signed: Renetta Ruiz    1/13/2023 9:47 AM EST    Workstation ID: [id number]          I reviewed the patient's new clinical results.  I reviewed the patient's new imaging results and agree with the interpretation.  I reviewed the patient's other test results and agree with the interpretation      Assessment & Plan       Diverticulitis of colon with perforation    Anxiety      52-year-old lady with diverticulitis with small amount of air in the abdomen and adjacent to colon.    I had a long discussion with the patient and her  regarding the management of diverticulitis.  She does have small amounts of free air in the abdomen seen on CT.  On exam she does not appear to have an acute abdomen or peritonitis.  She does have pain appropriately in the lower abdomen.  I discussed with them the possibility of proceeding with surgery for resection with ostomy versus continued antibiotics, bowel rest and observation with the possibility of surgery in the future if she becomes more ill.  She would like to avoid surgery if at all possible particularly as it includes a high likelihood of having an ostomy of some variety.  Therefore, we will continue her current course of management with antibiotics, IV fluids, n.p.o. status.  I will reassess her tomorrow and if she is improved we can discuss the possibility of liquids.  If she is the same or  worse then certainly we would recommend that she proceed to surgery.    I discussed the patient's findings and my recommendations with patient and family              This document has been electronically signed by Emerson Power MD on January 13, 2023 15:45 EST      Emerson Power MD  01/13/23  15:45 EST

## 2023-01-13 NOTE — H&P
[facility] Medicine Services  History & Physical    Patient Name: Opal Morales  : 1970  MRN: [id number]  Primary Care Physician:  Keturah Harp MD  Date of admission: 2023  Date and Time of Service: 2023 at 1100    Subjective      Chief Complaint: lower abdominal pain    History of Present Illness: Opal Morales is a 52 y.o. female with no significant medical history on no home medications presented to [facility] ED 2023 with complaints of lower abdominal pain. She stated at 2am this morning she had a intense pain in her left lower quadrant that felt like a hard knot and then she felt like the knot moved and then started having severe abdominal pain all the way across her lower abdomen and up into her upper abdomen. She had associated nausea but no vomiting. She had a bowel movement in the middle of the night. She is passing gas. She denied any history of diverticulitis. Her mother and maternal aunt both had diverticulitis.     In the ED CT abdomen/pelvis with contrast showed focal acute perforated sigmoid diverticulitis.  Small free air within the abdomen pelvis.  Small air-fluid pocket in the pelvis which does not appear to represent a well-formed abscess at this time.  Trace pelvic free fluid.  WBC 14.6, lactate 2.3.  Afebrile.  Heart rate 104, not hypotensive.  Patient was given IV fluids, antiemetics, analgesics, and IV Zosyn.  General surgery Dr. Power consulted by the ED requested patient remain n.p.o. with continued IV antibiotics.    Review of Systems   Constitutional: Negative.   HENT: Negative.    Eyes: Negative.    Cardiovascular: Negative.    Respiratory: Negative.    Endocrine: Negative.    Hematologic/Lymphatic: Negative.    Skin: Negative.    Musculoskeletal: Negative.    Gastrointestinal: Positive for abdominal pain and nausea.   Genitourinary: Negative.    Neurological: Negative.    Psychiatric/Behavioral: Negative.    Allergic/Immunologic:  Negative.    All other systems reviewed and are negative.       Personal History     Past Medical History:   Diagnosis Date   • SVT (supraventricular tachycardia) (HCC)        No past surgical history on file.    Family History: family history includes Hypertension in an other family member; Liver disease in an other family member.     Social History:  reports that she has never smoked. She has never used smokeless tobacco. She reports current alcohol use. She reports current drug use. Drug: Marijuana.    Home Medications:  Prior to Admission Medications     Prescriptions Last Dose Informant Patient Reported? Taking?    levonorgestrel (MIRENA) 20 MCG/24HR IUD   Yes No    MIRENA (52 MG) 20 MCG/24HR IUD    buPROPion XL (WELLBUTRIN XL) 300 MG 24 hr tablet   No No    TAKE 1 TABLET BY MOUTH DAILY    cetirizine (zyrTEC) 10 MG tablet   No No    TAKE 1 TABLET BY MOUTH DAILY    cyclobenzaprine (FLEXERIL) 10 MG tablet   No No    TAKE 1 TABLET BY MOUTH THREE TIMES DAILY AS NEEDED FOR MUSCLE SPASMS    fluticasone (FLONASE) 50 MCG/ACT nasal spray   No No    2 sprays into the nostril(s) as directed by provider Daily.    omeprazole (priLOSEC) 20 MG capsule   No No    Take 1 capsule by mouth Daily.    SUMAtriptan (Imitrex) 100 MG tablet   No No    Take one tablet at onset of headache. May repeat dose one time in 2 hours if headache not relieved.            Allergies:  Allergies   Allergen Reactions   • Codeine Nausea Only       Objective      Vitals:   Temp:  [97.6 °F (36.4 °C)] 97.6 °F (36.4 °C)  Heart Rate:  [] 107  Resp:  [20-22] 20  BP: (117-139)/(60-83) 121/66    Physical Exam  Vitals and nursing note reviewed.   HENT:      Head: Normocephalic and atraumatic.   Eyes:      Extraocular Movements: Extraocular movements intact.      Pupils: Pupils are equal, round, and reactive to light.   Cardiovascular:      Rate and Rhythm: Normal rate and regular rhythm.      Pulses: Normal pulses.      Heart sounds: Normal heart sounds.    Pulmonary:      Effort: Pulmonary effort is normal.      Breath sounds: Normal breath sounds.   Abdominal:      General: Bowel sounds are normal.      Palpations: Abdomen is soft.      Tenderness: There is abdominal tenderness.   Musculoskeletal:         General: Normal range of motion.   Skin:     General: Skin is warm and dry.   Neurological:      Mental Status: She is alert and oriented to person, place, and time.   Psychiatric:         Mood and Affect: Mood normal.         Behavior: Behavior normal.        Result Review    Result Review:  I have personally reviewed the results from the time of this admission to 1/13/2023 11:26 EST and agree with these findings:  [x]  Laboratory  []  Microbiology  [x]  Radiology  []  EKG/Telemetry   []  Cardiology/Vascular   []  Pathology  [x]  Old records    Assessment & Plan        Active Hospital Problems:  Active Hospital Problems    Diagnosis    • **Diverticulitis of colon with perforation    • Anxiety      Plan:     Acute perforated sigmoid diverticulitis  -CT a/p: focal acute perforated sigmoid diverticulitis.  Small free air within the abdomen pelvis.  Small air-fluid pocket in the pelvis which does not appear to represent a well-formed abscess at this time.  Trace pelvic free fluid.    -WBC 14.6, lactate 2.3, , not hypotensive  -IVFs at 200ml/hr, IV zosyn, IV antiemetics, IV analgesics  -general surgery consulted     Anxiety  -cont home Wellbutrin    Seasonal allergies  -zyrtec       DVT prophylaxis:  Mechanical DVT prophylaxis orders are present.    CODE STATUS:    Level Of Support Discussed With: Patient  Code Status (Patient has no pulse and is not breathing): CPR (Attempt to Resuscitate)  Medical Interventions (Patient has pulse or is breathing): Full Support    Admission Status:  I believe this patient meets inpatient status.    I discussed the patient's findings and my recommendations with patient.    This patient has been examined wearing appropriate  Personal Protective Equipment 01/13/23      Signature: Electronically signed by MARCIE Heller, 01/13/23, 11:26 EST

## 2023-01-13 NOTE — ED PROVIDER NOTES
"Subjective   History of Present Illness  Patient is a 52-year-old female who presents with bilateral lower abdominal pain since 2 AM this morning.  She describes the pain as a constant \"fullness\" but reports intermittent sharp pains as well.  She rates the pain a \"12\" out of 10 and reports increased pain with any sort of movement.  She has not take anything for the pain. Patient is currently sexually active with 1 male partner.  She has a Mirena IUD in place which has been present for 6 or 7 years.  Reports associated nausea and vomiting, denies hematemesis or diarrhea.  Denies dysuria or hematuria.  Reports chills, but denies recorded fever.  Reports drinking 2 beers a week but denies any tobacco or other drug use.  No shortness of breath or chest pain    No history of abdominal surgeries  LMP irregular due to IUD  NPO since 7 PM last evening    1. Location: Bilateral lower abdomen  2. Quality: Constant \"Fullness\", intermittent sharp pain  3. Severity: 12 out of 10  4. Worsening factors: Any sort of movement including breathing  5. Alleviating factors: Rest   6. Onset: 2 AM this morning  7. Radiation: Patient denies  8. Frequency: Constant with periods of intensity  9. Co-morbidities: Past Medical History:  No date: SVT (supraventricular tachycardia) (HCC)          Review of Systems    Past Medical History:   Diagnosis Date   • SVT (supraventricular tachycardia) (HCC)        Allergies   Allergen Reactions   • Codeine Nausea Only       No past surgical history on file.    Family History   Problem Relation Age of Onset   • Hypertension Other    • Liver disease Other        Social History     Socioeconomic History   • Marital status:    Tobacco Use   • Smoking status: Never   • Smokeless tobacco: Never   Vaping Use   • Vaping Use: Never used   Substance and Sexual Activity   • Alcohol use: Yes   • Drug use: Yes     Types: Marijuana           Objective   Physical Exam  Vitals and nursing note reviewed. Exam " conducted with a chaperone present (Dawna Lakhani, [place] Student).   Constitutional:       General: She is not in acute distress.     Appearance: Normal appearance. She is well-developed. She is not ill-appearing, toxic-appearing or diaphoretic.      Comments: Patient appears uncomfortable   HENT:      Head: Normocephalic and atraumatic.      Nose: Nose normal.      Mouth/Throat:      Mouth: Mucous membranes are moist.   Eyes:      General: No scleral icterus.  Cardiovascular:      Rate and Rhythm: Regular rhythm. Tachycardia present.      Heart sounds: Normal heart sounds, S1 normal and S2 normal. No murmur heard.    No friction rub. No gallop.   Pulmonary:      Effort: Pulmonary effort is normal.      Breath sounds: Normal breath sounds.   Abdominal:      General: Abdomen is flat. Bowel sounds are normal. There is no distension. There are no signs of injury.      Palpations: Abdomen is soft. There is no mass.      Tenderness: There is abdominal tenderness in the right lower quadrant, suprapubic area and left lower quadrant. There is no right CVA tenderness, left CVA tenderness, guarding or rebound.      Hernia: No hernia is present.   Genitourinary:     Exam position: Supine.      Vagina: Prolapsed vaginal walls present. No vaginal discharge or tenderness.      Cervix: Cervical motion tenderness present. No discharge or cervical bleeding.      Uterus: Tender.       Adnexa:         Right: Tenderness present. No mass or fullness.          Left: Tenderness present. No mass or fullness.        Comments: IUD string noted from os   Skin:     General: Skin is warm and dry.      Capillary Refill: Capillary refill takes less than 2 seconds.      Coloration: Skin is not jaundiced or pale.      Findings: No rash.   Neurological:      General: No focal deficit present.      Mental Status: She is alert and oriented to person, place, and time. Mental status is at baseline.   Psychiatric:         Mood and Affect: Mood normal.          Behavior: Behavior normal.         Thought Content: Thought content normal.         Judgment: Judgment normal.         Procedures           ED Course  ED Course as of 01/13/23 1619   Fri Jan 13, 2023   0909 Delay in care related to urine not being collected; awaiting patient to go to CT. [AL]   0944 CT Abdomen Pelvis With Contrast  Spoke with Dr. Ruiz, radiologist at this time who reports patient has a perfect sigmoid diverticuli. [AL]   0956 Spoke with Dr. Power at this time. He would like patient to remain NPO and on IV abx, which have already been initiated with admission to the Hospitalist.  [AL]      ED Course User Index  [AL] Pilar Alvarado, MARCIE      CT Abdomen Pelvis With Contrast    Result Date: 1/13/2023  1.  Focal acute perforated sigmoid diverticulitis. Small free air within the abdomen pelvis. Small air-fluid pocket in the pelvis which does not appear to represent a well-formed abscess at this time. Trace pelvic free fluid. I notified the emergency room clinician. Electronically Signed: Renetta Ruiz  1/13/2023 9:47 AM EST  Workstation ID: [id number]    Medications   sodium chloride 0.9 % flush 10 mL (has no administration in time range)   lactated ringers infusion (200 mL/hr Intravenous Currently Infusing 1/13/23 1534)   morphine injection 2 mg (2 mg Intravenous Given 1/13/23 1457)   sodium chloride 0.9 % flush 10 mL (10 mL Intravenous Given 1/13/23 1533)   sodium chloride 0.9 % flush 10 mL (has no administration in time range)   sodium chloride 0.9 % infusion 40 mL (has no administration in time range)   acetaminophen (TYLENOL) tablet 650 mg (has no administration in time range)     Or   acetaminophen (TYLENOL) 160 MG/5ML solution 650 mg (has no administration in time range)     Or   acetaminophen (TYLENOL) suppository 650 mg (has no administration in time range)   aluminum-magnesium hydroxide-simethicone (MAALOX MAX) 400-400-40 MG/5ML suspension 15 mL (has no administration in time range)    ondansetron (ZOFRAN) tablet 4 mg ( Oral Not Given:  See Alt 1/13/23 1505)     Or   ondansetron (ZOFRAN) injection 4 mg (4 mg Intravenous Given 1/13/23 1505)   melatonin tablet 5 mg (has no administration in time range)   piperacillin-tazobactam (ZOSYN) IVPB 3.375 g in 100 mL NS (CD) (3.375 g Intravenous New Bag 1/13/23 1532)   HYDROmorphone (DILAUDID) injection 0.5 mg (0.5 mg Intravenous Given 1/13/23 0743)   ondansetron (ZOFRAN) injection 4 mg (4 mg Intravenous Given 1/13/23 0743)   lactated ringers bolus 1,000 mL (0 mL Intravenous Stopped 1/13/23 0840)   piperacillin-tazobactam (ZOSYN) IVPB 3.375 g in 100 mL NS (CD) (0 g Intravenous Stopped 1/13/23 1017)   iopamidol (ISOVUE-370) 76 % injection 100 mL (100 mL Intravenous Given 1/13/23 0934)   morphine injection 2 mg (2 mg Intravenous Given 1/13/23 1015)   metoclopramide (REGLAN) injection 10 mg (10 mg Intravenous Given 1/13/23 1015)   diphenhydrAMINE (BENADRYL) injection 25 mg (25 mg Intravenous Given 1/13/23 1015)     Labs Reviewed   COMPREHENSIVE METABOLIC PANEL - Abnormal; Notable for the following components:       Result Value    Glucose 150 (*)     CO2 21.0 (*)     ALT (SGPT) 57 (*)     AST (SGOT) 42 (*)     Anion Gap 16.0 (*)     All other components within normal limits    Narrative:     GFR Normal >60  Chronic Kidney Disease <60  Kidney Failure <15     CBC WITH AUTO DIFFERENTIAL - Abnormal; Notable for the following components:    WBC 14.60 (*)     RDW 12.2 (*)     Neutrophil % 88.8 (*)     Lymphocyte % 6.7 (*)     Monocyte % 4.3 (*)     Eosinophil % 0.0 (*)     Neutrophils, Absolute 12.90 (*)     All other components within normal limits   LACTIC ACID, REFLEX - Abnormal; Notable for the following components:    Lactate 3.2 (*)     All other components within normal limits   POC LACTATE - Abnormal; Notable for the following components:    Lactate 2.3 (*)     All other components within normal limits   WET PREP, GENITAL - Normal   CHLAMYDIA TRACHOMATIS,  NEISSERIA GONORRHOEAE, PCR - Normal   LIPASE - Normal   PREGNANCY, URINE - Normal   BLOOD CULTURE   BLOOD CULTURE   RAINBOW DRAW    Narrative:     The following orders were created for panel order Trout Draw.  Procedure                               Abnormality         Status                     ---------                               -----------         ------                     Green Top (Gel)[[id number]]                                  Final result               Lavender Top[[id number]]                                     Final result               Gold Top - SST[[id number]]                                   Final result               Light Blue Top[[id number]]                                   Final result                 Please view results for these tests on the individual orders.   URINALYSIS W/ MICROSCOPIC IF INDICATED (NO CULTURE)   POC LACTATE   CBC AND DIFFERENTIAL    Narrative:     The following orders were created for panel order CBC & Differential.  Procedure                               Abnormality         Status                     ---------                               -----------         ------                     CBC Auto Differential[[id number]]        Abnormal            Final result                 Please view results for these tests on the individual orders.   GREEN TOP   LAVENDER TOP   GOLD TOP - SST   LIGHT BLUE TOP                                          Medical Decision Making  Chart Review:Patient seen 3/29/2022 by [facility] for routine visit.  See assessment and plan as below.  Comorbidity: Past Medical History:  No date: SVT (supraventricular tachycardia) ([facility])  Imaging: Was interpreted by physician and reviewed by myself: CT Abdomen Pelvis With Contrast   Final Result    1.  Focal acute perforated sigmoid diverticulitis. Small free air within the abdomen pelvis. Small air-fluid pocket in the pelvis which does not appear to represent a well-formed abscess at this time. Trace pelvic  free fluid. I notified the emergency     room clinician.                Electronically Signed: Renetta Ruiz      1/13/2023 9:47 AM EST      Workstation ID: [id number]      Patient undressed and placed in gown for exam.  Appropriate PPE worn during patient exam.  Patient oriented x3.  Cardiac irregular rhythm on exam.  Lungs clear to auscultation bilaterally.  Lower abdominal tenderness on exam.  IV established and labs obtained.  0.5 Dilaudid and 4 of Zofran given for pain.  POC lactate noted to be 2.3.  Zosyn initiated. CBC significant for leukocytosis with left shift.  CMP significant for glucose 150, CO2 21, ALT 57, AST 42.  Lipase 25.  Gonorrhea chlamydia negative.  Wet prep negative.  CT results as above.  Spoke with Dr. Power, general surgery.  See details below.  Spoke with OLEGARIO Westbrook who accepts the patient on behalf of Dr. Phillips.    Disposition/Treatment: Discussed results with patient, verbalized understanding.  Agreeable with plan of care.  Patient was stable upon admission     Differential Diagnoses, not all-inclusive and does not constitute entirety of all causes: Ovarian cyst, IUD migration, gastritis, intra-abdominal infection    Upon reassessment, patient reports increased pain.  She did not like how the Dilaudid made her feel, so she request something different for pain.  Morphine 2 mg, Reglan 10 mg and Benadryl ordered.  Stable upon admission to hospitalist.    Part of this note may be an electronic transcription/translation of spoken language to printed text using the Dragon Dictation System.         Lower abdominal pain: acute illness or injury  Nausea and vomiting, unspecified vomiting type: acute illness or injury  Perforated diverticulum: acute illness or injury  Sepsis, due to unspecified organism, unspecified whether acute organ dysfunction present (HCC): acute illness or injury  Amount and/or Complexity of Data Reviewed  External Data Reviewed: notes.     Details: Assessment/Plan       Problems Addressed this Visit           Allergies and Adverse Reactions    Allergic rhinitis        - She will continue her Zyrtec 10 mg and I refilled her Flonase.                  Gastrointestinal Abdominal     Gastroesophageal reflux disease - Primary    Relevant Medications    omeprazole (priLOSEC) 20 MG capsule    Screening for colon cancer        - She is due for her colonoscopy and I have ordered it.            Relevant Orders    Ambulatory Referral For Screening Colonoscopy (Completed)          Health Encounters    Healthcare maintenance        - She was counseled on the needs to by make her appointment with her GYN for her Pap smear mammograms.             Encounter for weight management        - She was counseled on the need for weight loss.                  Mental Health    Anxiety        - She will continue the Wellbutrin  mg daily.        Labs: ordered. Decision-making details documented in ED Course.  Radiology: ordered. Decision-making details documented in ED Course.  Discussion of management or test interpretation with external provider(s): Spoke with Dr. Power, gen surgery.  He would like patient to be admitted to the hospitalist.  He would like the patient to remain on IV antibiotics and remain n.p.o.    Risk  Prescription drug management.  Decision regarding hospitalization.          Final diagnoses:   Lower abdominal pain   Nausea and vomiting, unspecified vomiting type   Sepsis, due to unspecified organism, unspecified whether acute organ dysfunction present (HCC)   Perforated diverticulum       ED Disposition  ED Disposition     ED Disposition   Decision to Admit    Condition   --    Comment   Level of Care: Telemetry [5]   Diagnosis: Diverticulitis of colon with perforation [705549]   Admitting Physician: FIFI MAMHOOD [[id number]]   Attending Physician: FIFI MAHMOOD [[id number]]   Certification: I Certify That Inpatient Hospital Services Are Medically Necessary For Greater Than 2  Midnights               No follow-up provider specified.       Medication List      No changes were made to your prescriptions during this visit.          Pilar Alvarado, APRN  01/13/23 5732

## 2023-01-13 NOTE — PAYOR COMM NOTE
"INITIAL CLINICAL - Ref # [id number]    Please advise if additional clinical is needed to process this request.  Thank you!    Deanne Norman  Utilization Review Coordinator  [facility]  [address] [place]  [address]  Ph: [phone]  Fx: [phone]    Jv: NPI  [id number] Tax ID [id number]       Ref # [id number]      Opal Trammell (52 y.o. Female)     Date of Birth   1970    Social Security Number       Address   [address] [place] [address]    Home Phone   [phone]    MRN   [id number]       Mandaen   None    Marital Status                               Admission Date   1/13/23    Admission Type   Emergency    Admitting Provider   Matthew Phillips MD    Attending Provider   Matthew Phillips MD    Department, Room/Bed   [facility] EMERGENCY DEPARTMENT, 11/11       Discharge Date       Discharge Disposition       Discharge Destination                               Attending Provider: Matthew Phillips MD    Allergies: Codeine    Isolation: None   Infection: None   Code Status: CPR    Ht: 165.1 cm (65\")   Wt: 75.8 kg (167 lb 1.7 oz)    Admission Cmt: None   Principal Problem: Diverticulitis of colon with perforation [K57.20]                 Active Insurance as of 1/13/2023     Primary Coverage     Payor Plan Insurance Group Employer/Plan Group    HUMANA HUMANA [id number]     Payor Plan Address Payor Plan Phone Number Payor Plan Fax Number Effective Dates    [address] [address] [phone]  7/1/2012 - None Entered    Formerly [facility] [id number]       Subscriber Name Subscriber Birth Date Member ID       MAYCOL TRAMMELL 4/9/1969 [id number]                 Emergency Contacts      (Rel.) Home Phone Work Phone Mobile Phone    DANGELO TRAMMELL (Spouse) [phone] -- --    VALERIANG (Son) -- -- --    VALERIAYALA (Son) -- -- --             Diverticulitis, Acute RRG - Inpatient Care by Kaci Patel, RN       Met (Selected): Reviewed on 1/13/2023 by " Kaci Patel RN       Created Using Review Status Review Entered   Indicia® Completed 2023 1431       Created By   Kaci Patel RN       Criteria Set Name - Subset   Diverticulitis, Acute RRG - Inpatient Care       Selected?   Yes - Inpatient Care is selected for the Diverticulitis, Acute RRG criteria set.      Criteria Review      Inpatient Care    Most Recent : Kaci Patel Most Recent Date: 2023 14:31:09 EST    (X) Admission is indicated for  1 or more  of the following :       (X) Significant abnormality on imaging study, including  1 or more  of the following :          (X) Kai or free perforation (eg, free air under the diaphragm with or without contrast extravasation)          [A]          2023 14:31:09 EST by Kaci Patel            -CT a/p: focal acute perforated sigmoid diverticulitis.  Small free air within the abdomen pelvis.  Small air-fluid pocket in the pelvis which does not appear to represent a well-formed abscess at this time.  Trace pelvic free fluid    Notes:    2023 14:31:09 EST by Kaci Patel    Subject: Admission         Acute perforated sigmoid diverticulitis    -CT a/p: focal acute perforated sigmoid diverticulitis.  Small free air within the abdomen pelvis.  Small air-fluid pocket in the pelvis which does not appear to represent a well-formed abscess at this time.  Trace pelvic free fluid.     -WBC 14.6, lactate 2.3, , not hypotensive    -IVFs at 200ml/hr, IV zosyn, IV antiemetics, IV analgesics    -general surgery consulted               History & Physical      Dariana Smith APRN at 23 1046     Attestation signed by Matthew Phillips MD at 23 1509    I have reviewed this documentation and agree.                      [facility]  History & Physical    Patient Name: Opal Morales  : 1970  MRN: [id number]  Primary Care Physician:  Keturah Harp MD  Date of  admission: 1/13/2023  Date and Time of Service: 01/13/2023 at 1100    Subjective       Chief Complaint: lower abdominal pain    History of Present Illness: Opal Morales is a 52 y.o. female with no significant medical history on no home medications presented to [facility] ED 01/13/2023 with complaints of lower abdominal pain. She stated at 2am this morning she had a intense pain in her left lower quadrant that felt like a hard knot and then she felt like the knot moved and then started having severe abdominal pain all the way across her lower abdomen and up into her upper abdomen. She had associated nausea but no vomiting. She had a bowel movement in the middle of the night. She is passing gas. She denied any history of diverticulitis. Her mother and maternal aunt both had diverticulitis.     In the ED CT abdomen/pelvis with contrast showed focal acute perforated sigmoid diverticulitis.  Small free air within the abdomen pelvis.  Small air-fluid pocket in the pelvis which does not appear to represent a well-formed abscess at this time.  Trace pelvic free fluid.  WBC 14.6, lactate 2.3.  Afebrile.  Heart rate 104, not hypotensive.  Patient was given IV fluids, antiemetics, analgesics, and IV Zosyn.  General surgery Dr. Power consulted by the ED requested patient remain n.p.o. with continued IV antibiotics.    Review of Systems   Constitutional: Negative.   HENT: Negative.    Eyes: Negative.    Cardiovascular: Negative.    Respiratory: Negative.    Endocrine: Negative.    Hematologic/Lymphatic: Negative.    Skin: Negative.    Musculoskeletal: Negative.    Gastrointestinal: Positive for abdominal pain and nausea.   Genitourinary: Negative.    Neurological: Negative.    Psychiatric/Behavioral: Negative.    Allergic/Immunologic: Negative.    All other systems reviewed and are negative.       Personal History     Past Medical History:   Diagnosis Date   • SVT (supraventricular tachycardia) (HCC)        No past surgical  history on file.    Family History: family history includes Hypertension in an other family member; Liver disease in an other family member.     Social History:  reports that she has never smoked. She has never used smokeless tobacco. She reports current alcohol use. She reports current drug use. Drug: Marijuana.    Home Medications:  Prior to Admission Medications     Prescriptions Last Dose Informant Patient Reported? Taking?    levonorgestrel (MIRENA) 20 MCG/24HR IUD   Yes No    MIRENA (52 MG) 20 MCG/24HR IUD    buPROPion XL (WELLBUTRIN XL) 300 MG 24 hr tablet   No No    TAKE 1 TABLET BY MOUTH DAILY    cetirizine (zyrTEC) 10 MG tablet   No No    TAKE 1 TABLET BY MOUTH DAILY    cyclobenzaprine (FLEXERIL) 10 MG tablet   No No    TAKE 1 TABLET BY MOUTH THREE TIMES DAILY AS NEEDED FOR MUSCLE SPASMS    fluticasone (FLONASE) 50 MCG/ACT nasal spray   No No    2 sprays into the nostril(s) as directed by provider Daily.    omeprazole (priLOSEC) 20 MG capsule   No No    Take 1 capsule by mouth Daily.    SUMAtriptan (Imitrex) 100 MG tablet   No No    Take one tablet at onset of headache. May repeat dose one time in 2 hours if headache not relieved.            Allergies:  Allergies   Allergen Reactions   • Codeine Nausea Only       Objective       Vitals:   Temp:  [97.6 °F (36.4 °C)] 97.6 °F (36.4 °C)  Heart Rate:  [] 107  Resp:  [20-22] 20  BP: (117-139)/(60-83) 121/66    Physical Exam  Vitals and nursing note reviewed.   HENT:      Head: Normocephalic and atraumatic.   Eyes:      Extraocular Movements: Extraocular movements intact.      Pupils: Pupils are equal, round, and reactive to light.   Cardiovascular:      Rate and Rhythm: Normal rate and regular rhythm.      Pulses: Normal pulses.      Heart sounds: Normal heart sounds.   Pulmonary:      Effort: Pulmonary effort is normal.      Breath sounds: Normal breath sounds.   Abdominal:      General: Bowel sounds are normal.      Palpations: Abdomen is soft.       Tenderness: There is abdominal tenderness.   Musculoskeletal:         General: Normal range of motion.   Skin:     General: Skin is warm and dry.   Neurological:      Mental Status: She is alert and oriented to person, place, and time.   Psychiatric:         Mood and Affect: Mood normal.         Behavior: Behavior normal.        Result Review    Result Review:  I have personally reviewed the results from the time of this admission to 1/13/2023 11:26 EST and agree with these findings:  [x]  Laboratory  []  Microbiology  [x]  Radiology  []  EKG/Telemetry   []  Cardiology/Vascular   []  Pathology  [x]  Old records    Assessment & Plan        Active Hospital Problems:  Active Hospital Problems    Diagnosis    • **Diverticulitis of colon with perforation    • Anxiety      Plan:     Acute perforated sigmoid diverticulitis  -CT a/p: focal acute perforated sigmoid diverticulitis.  Small free air within the abdomen pelvis.  Small air-fluid pocket in the pelvis which does not appear to represent a well-formed abscess at this time.  Trace pelvic free fluid.    -WBC 14.6, lactate 2.3, , not hypotensive  -IVFs at 200ml/hr, IV zosyn, IV antiemetics, IV analgesics  -general surgery consulted     Anxiety  -cont home Wellbutrin    Seasonal allergies  -zyrtec       DVT prophylaxis:  Mechanical DVT prophylaxis orders are present.    CODE STATUS:    Level Of Support Discussed With: Patient  Code Status (Patient has no pulse and is not breathing): CPR (Attempt to Resuscitate)  Medical Interventions (Patient has pulse or is breathing): Full Support    Admission Status:  I believe this patient meets inpatient status.    I discussed the patient's findings and my recommendations with patient.    This patient has been examined wearing appropriate Personal Protective Equipment 01/13/23      Signature: Electronically signed by MARCIE Heller, 01/13/23, 11:26 EST    Electronically signed by Matthew Phillips MD at 01/13/23 2245           Emergency Department Notes      Lulú Ricci, RN at 01/13/23 0851        Pt encouraged to urinate.     Electronically signed by Lulú Ricci RN at 01/13/23 0852     Lulú Ricci, RN at 01/13/23 0915        Pt able to void without the use of straight cath. Provider made aware.     Electronically signed by Lulú Ricci RN at 01/13/23 0915       Vital Signs (last day)     Date/Time Temp Temp src Pulse Resp BP Patient Position SpO2    01/13/23 1243 -- -- 107 -- 130/68 -- 94    01/13/23 12:13:37 -- -- 103 18 133/69 Lying 95    01/13/23 1143 -- -- 103 -- 133/69 -- 94    01/13/23 1130 -- -- 100 18 124/58 Lying 95    01/13/23 1100 -- -- 107 20 121/66 Sitting 94    01/13/23 1044 -- -- 105 -- 123/68 -- 96    01/13/23 0959 -- -- 107 -- 124/80 -- 97    01/13/23 0941 -- -- 104 -- 139/68 -- 98    01/13/23 0918 -- -- 98 -- 136/72 -- 94    01/13/23 0800 -- -- 80 -- 117/60 -- 95    01/13/23 0731 -- -- 82 -- 139/83 -- 100    01/13/23 0713 -- -- 104 -- 136/79 -- 97    01/13/23 0711 97.6 (36.4) -- -- 22 -- -- --              Lab Results (last 24 hours)     Procedure Component Value Units Date/Time    STAT Lactic Acid, Reflex [[id number]]  (Abnormal) Collected: 01/13/23 1124    Specimen: Blood Updated: 01/13/23 1150     Lactate 3.2 mmol/L     Chlamydia trachomatis, Neisseria gonorrhoeae, PCR - Swab, Urine, Random Void [[id number]]  (Normal) Collected: 01/13/23 0908    Specimen: Swab from Urine, Random Void Updated: 01/13/23 1052     Chlamydia DNA by PCR Not Detected     Neisseria gonorrhoeae by PCR Not Detected    Pregnancy, Urine - Urine, Clean Catch [[id number]]  (Normal) Collected: 01/13/23 0908    Specimen: Urine, Clean Catch Updated: 01/13/23 0919     HCG, Urine QL Negative    Lipase [[id number]]  (Normal) Collected: 01/13/23 0729    Specimen: Blood Updated: 01/13/23 0908     Lipase 25 U/L     Wet Prep, Genital - Swab, Vagina [[id number]]  (Normal) Collected: 01/13/23 0831    Specimen: Swab from Vagina Updated: 01/13/23  0842     YEAST No yeast seen     HYPHAL ELEMENTS No Hyphal elements seen     WBC'S No WBC's seen     Clue Cells, Wet Prep No Clue cells seen     Trichomonas, Wet Prep No Trichomonas seen    [place] Draw [[id number]] Collected: 01/13/23 0729    Specimen: Blood Updated: 01/13/23 0830    Narrative:      The following orders were created for panel order [place] Draw.  Procedure                               Abnormality         Status                     ---------                               -----------         ------                     Green Top (Gel)[[id number]]                                  Final result               Lavender Top[[id number]]                                     Final result               Gold Top - SST[[id number]]                                   Final result               Light Blue Top[[id number]]                                   Final result                 Please view results for these tests on the individual orders.    Green Top (Gel) [[id number]] Collected: 01/13/23 0729    Specimen: Blood Updated: 01/13/23 0830     Extra Tube Hold for add-ons.     Comment: Auto resulted.       Gold Top - SST [[id number]] Collected: 01/13/23 0729    Specimen: Blood Updated: 01/13/23 0830     Extra Tube Hold for add-ons.     Comment: Auto resulted.       Lavender Top [[id number]] Collected: 01/13/23 0729    Specimen: Blood Updated: 01/13/23 0830     Extra Tube hold for add-on     Comment: Auto resulted       Light Blue Top [[id number]] Collected: 01/13/23 0729    Specimen: Blood Updated: 01/13/23 0830     Extra Tube Hold for add-ons.     Comment: Auto resulted       Blood Culture - Blood, Arm, Left [[id number]] Collected: 01/13/23 0814    Specimen: Blood from Arm, Left Updated: 01/13/23 0824    Comprehensive Metabolic Panel [[id number]]  (Abnormal) Collected: 01/13/23 0729    Specimen: Blood Updated: 01/13/23 0808     Glucose 150 mg/dL      BUN 13 mg/dL      Creatinine 0.74 mg/dL      Sodium 137 mmol/L      Potassium  3.6 mmol/L      Comment: Slight hemolysis detected by analyzer. Results may be affected.        Chloride 100 mmol/L      CO2 21.0 mmol/L      Calcium 9.6 mg/dL      Total Protein 7.5 g/dL      Albumin 4.8 g/dL      ALT (SGPT) 57 U/L      AST (SGOT) 42 U/L      Alkaline Phosphatase 78 U/L      Total Bilirubin 0.9 mg/dL      Globulin 2.7 gm/dL      A/G Ratio 1.8 g/dL      BUN/Creatinine Ratio 17.6     Anion Gap 16.0 mmol/L      eGFR 96.9 mL/min/1.73      Comment: National Kidney Foundation and American Society of Nephrology (ASN) Task Force recommended calculation based on the Chronic Kidney Disease Epidemiology Collaboration (CKD-EPI) equation refit without adjustment for race.       Narrative:      GFR Normal >60  Chronic Kidney Disease <60  Kidney Failure <15      CBC & Differential [[id number]]  (Abnormal) Collected: 01/13/23 0729    Specimen: Blood Updated: 01/13/23 0753    Narrative:      The following orders were created for panel order CBC & Differential.  Procedure                               Abnormality         Status                     ---------                               -----------         ------                     CBC Auto Differential[[id number]]        Abnormal            Final result                 Please view results for these tests on the individual orders.    CBC Auto Differential [[id number]]  (Abnormal) Collected: 01/13/23 0729    Specimen: Blood Updated: 01/13/23 0753     WBC 14.60 10*3/mm3      RBC 5.15 10*6/mm3      Hemoglobin 15.3 g/dL      Hematocrit 45.8 %      MCV 89.0 fL      MCH 29.7 pg      MCHC 33.4 g/dL      RDW 12.2 %      RDW-SD 38.1 fl      MPV 7.4 fL      Platelets 364 10*3/mm3      Neutrophil % 88.8 %      Lymphocyte % 6.7 %      Monocyte % 4.3 %      Eosinophil % 0.0 %      Basophil % 0.2 %      Neutrophils, Absolute 12.90 10*3/mm3      Lymphocytes, Absolute 1.00 10*3/mm3      Monocytes, Absolute 0.60 10*3/mm3      Eosinophils, Absolute 0.00 10*3/mm3      Basophils,  Absolute 0.00 10*3/mm3      nRBC 0.0 /100 WBC     Blood Culture - Blood, Arm, Right [[id number]] Collected: 01/13/23 0729    Specimen: Blood from Arm, Right Updated: 01/13/23 0743    POC Lactate [[id number]]  (Abnormal) Collected: 01/13/23 0737    Specimen: Blood Updated: 01/13/23 0738     Lactate 2.3 mmol/L      Comment: Serial Number: [id number]:  [id number]           Imaging Results (Last 24 Hours)     Procedure Component Value Units Date/Time    CT Abdomen Pelvis With Contrast [[id number]] Collected: 01/13/23 0936     Updated: 01/13/23 0949    Narrative:      CT ABDOMEN PELVIS W CONTRAST    Date of Exam: 1/13/2023 9:32 AM EST    Indication: lower abdominal pain,bilateral. epigastric pressure.    Comparison: None available.    Technique: Axial CT images were obtained of the abdomen and pelvis following the uneventful intravenous administration of iodinated contrast. Sagittal and coronal reconstructions were performed.  Automated exposure control and iterative reconstruction   methods were used.     Findings:  Focal acute diverticulitis changes are present in the mid sigmoid colon. Small foci of free intraperitoneal air are scattered throughout the abdomen and pelvis, consistent with diverticular perforation. A small air-fluid collection is seen in the midline   of the pelvic mesentery, measuring 1.4 x 1.6 cm, without well-formed walls, and does not appear to represent a well-formed abscess, at this time. There is no indication of upstream bowel obstruction.    The lung bases are free of consolidation. There are some bandlike atelectasis within the right lower lobe. The heart size is normal. Small esophageal hiatal hernia is present.    6 mm low-density lesions within the right hepatic lobe and left hepatic lobe are too small to characterize, statistically favored to represent cysts. The gallbladder, spleen, pancreas, adrenals and kidneys are within normal limits. The urinary bladder   and rectum are normal.  Hysterectomy intrauterine device is in place.. Trace pelvic free fluid is present.    No acute osseous abnormalities are identified. There is moderate diminished disc height at the L5-S1 level. Probable small bone island in the S1 sacral segment and within the T10 vertebral body.      Impression:      1.  Focal acute perforated sigmoid diverticulitis. Small free air within the abdomen pelvis. Small air-fluid pocket in the pelvis which does not appear to represent a well-formed abscess at this time. Trace pelvic free fluid. I notified the emergency   room clinician.        Electronically Signed: Renetta Ruiz    1/13/2023 9:47 AM EST    Workstation ID: [id number]

## 2023-01-13 NOTE — PLAN OF CARE
Goal Outcome Evaluation:      Pt admitted with diverticulosis. Plan to treat with IV abx, fluids, pain meds. Dr. Power to assess pt tomorrow to determine if sx is necessary. Pt is NPO.

## 2023-01-13 NOTE — Clinical Note
Level of Care: Telemetry [5]   Admitting Physician: FIFI MAHMOOD [[id number]]   Attending Physician: FIFI MAHMOOD [[id number]]

## 2023-01-14 LAB
ANION GAP SERPL CALCULATED.3IONS-SCNC: 7 MMOL/L (ref 5–15)
BACTERIA UR QL AUTO: ABNORMAL /HPF
BASOPHILS # BLD AUTO: 0 10*3/MM3 (ref 0–0.2)
BASOPHILS NFR BLD AUTO: 0.1 % (ref 0–1.5)
BILIRUB UR QL STRIP: NEGATIVE
BUN SERPL-MCNC: 18 MG/DL (ref 6–20)
BUN/CREAT SERPL: 24.3 (ref 7–25)
CALCIUM SPEC-SCNC: 8.9 MG/DL (ref 8.6–10.5)
CHLORIDE SERPL-SCNC: 105 MMOL/L (ref 98–107)
CLARITY UR: ABNORMAL
CO2 SERPL-SCNC: 28 MMOL/L (ref 22–29)
COLOR UR: ABNORMAL
CREAT SERPL-MCNC: 0.74 MG/DL (ref 0.57–1)
D-LACTATE SERPL-SCNC: 0.8 MMOL/L (ref 0.5–2)
DEPRECATED RDW RBC AUTO: 40.7 FL (ref 37–54)
EGFRCR SERPLBLD CKD-EPI 2021: 96.9 ML/MIN/1.73
EOSINOPHIL # BLD AUTO: 0 10*3/MM3 (ref 0–0.4)
EOSINOPHIL NFR BLD AUTO: 0.3 % (ref 0.3–6.2)
ERYTHROCYTE [DISTWIDTH] IN BLOOD BY AUTOMATED COUNT: 12.4 % (ref 12.3–15.4)
GLUCOSE SERPL-MCNC: 111 MG/DL (ref 65–99)
GLUCOSE UR STRIP-MCNC: NEGATIVE MG/DL
HCT VFR BLD AUTO: 36.2 % (ref 34–46.6)
HGB BLD-MCNC: 12.4 G/DL (ref 12–15.9)
HGB UR QL STRIP.AUTO: NEGATIVE
HYALINE CASTS UR QL AUTO: ABNORMAL /LPF
KETONES UR QL STRIP: NEGATIVE
LEUKOCYTE ESTERASE UR QL STRIP.AUTO: ABNORMAL
LYMPHOCYTES # BLD AUTO: 0.7 10*3/MM3 (ref 0.7–3.1)
LYMPHOCYTES NFR BLD AUTO: 6.8 % (ref 19.6–45.3)
MCH RBC QN AUTO: 30.4 PG (ref 26.6–33)
MCHC RBC AUTO-ENTMCNC: 34.2 G/DL (ref 31.5–35.7)
MCV RBC AUTO: 89 FL (ref 79–97)
MONOCYTES # BLD AUTO: 0.5 10*3/MM3 (ref 0.1–0.9)
MONOCYTES NFR BLD AUTO: 5.1 % (ref 5–12)
NEUTROPHILS NFR BLD AUTO: 87.7 % (ref 42.7–76)
NEUTROPHILS NFR BLD AUTO: 9 10*3/MM3 (ref 1.7–7)
NITRITE UR QL STRIP: POSITIVE
NRBC BLD AUTO-RTO: 0 /100 WBC (ref 0–0.2)
PH UR STRIP.AUTO: 5.5 [PH] (ref 5–8)
PLATELET # BLD AUTO: 225 10*3/MM3 (ref 140–450)
PMV BLD AUTO: 7.3 FL (ref 6–12)
POTASSIUM SERPL-SCNC: 4.5 MMOL/L (ref 3.5–5.2)
PROT UR QL STRIP: ABNORMAL
RBC # BLD AUTO: 4.06 10*6/MM3 (ref 3.77–5.28)
RBC # UR STRIP: ABNORMAL /HPF
REF LAB TEST METHOD: ABNORMAL
SODIUM SERPL-SCNC: 140 MMOL/L (ref 136–145)
SP GR UR STRIP: 1.03 (ref 1–1.03)
SQUAMOUS #/AREA URNS HPF: ABNORMAL /HPF
UROBILINOGEN UR QL STRIP: ABNORMAL
WBC # UR STRIP: ABNORMAL /HPF
WBC NRBC COR # BLD: 10.2 10*3/MM3 (ref 3.4–10.8)

## 2023-01-14 PROCEDURE — 25010000002 ONDANSETRON PER 1 MG: Performed by: NURSE PRACTITIONER

## 2023-01-14 PROCEDURE — 99232 SBSQ HOSP IP/OBS MODERATE 35: CPT | Performed by: SURGERY

## 2023-01-14 PROCEDURE — 80048 BASIC METABOLIC PNL TOTAL CA: CPT | Performed by: NURSE PRACTITIONER

## 2023-01-14 PROCEDURE — 85025 COMPLETE CBC W/AUTO DIFF WBC: CPT | Performed by: NURSE PRACTITIONER

## 2023-01-14 PROCEDURE — 81001 URINALYSIS AUTO W/SCOPE: CPT | Performed by: NURSE PRACTITIONER

## 2023-01-14 PROCEDURE — 25010000002 ENOXAPARIN PER 10 MG: Performed by: INTERNAL MEDICINE

## 2023-01-14 PROCEDURE — 25010000002 MORPHINE PER 10 MG: Performed by: NURSE PRACTITIONER

## 2023-01-14 PROCEDURE — 83605 ASSAY OF LACTIC ACID: CPT | Performed by: INTERNAL MEDICINE

## 2023-01-14 PROCEDURE — 25010000002 PIPERACILLIN SOD-TAZOBACTAM PER 1 G: Performed by: NURSE PRACTITIONER

## 2023-01-14 RX ORDER — HYDROCODONE BITARTRATE AND ACETAMINOPHEN 5; 325 MG/1; MG/1
1 TABLET ORAL EVERY 4 HOURS PRN
Status: DISCONTINUED | OUTPATIENT
Start: 2023-01-14 | End: 2023-01-16 | Stop reason: HOSPADM

## 2023-01-14 RX ORDER — ENOXAPARIN SODIUM 100 MG/ML
40 INJECTION SUBCUTANEOUS EVERY 24 HOURS
Status: DISCONTINUED | OUTPATIENT
Start: 2023-01-14 | End: 2023-01-16 | Stop reason: HOSPADM

## 2023-01-14 RX ADMIN — ONDANSETRON 4 MG: 2 INJECTION INTRAMUSCULAR; INTRAVENOUS at 01:46

## 2023-01-14 RX ADMIN — Medication 10 ML: at 20:15

## 2023-01-14 RX ADMIN — ONDANSETRON 4 MG: 2 INJECTION INTRAMUSCULAR; INTRAVENOUS at 21:37

## 2023-01-14 RX ADMIN — MORPHINE SULFATE 2 MG: 2 INJECTION, SOLUTION INTRAMUSCULAR; INTRAVENOUS at 11:50

## 2023-01-14 RX ADMIN — PIPERACILLIN AND TAZOBACTAM 3.38 G: 3; .375 INJECTION, POWDER, FOR SOLUTION INTRAVENOUS at 05:25

## 2023-01-14 RX ADMIN — PIPERACILLIN AND TAZOBACTAM 3.38 G: 3; .375 INJECTION, POWDER, FOR SOLUTION INTRAVENOUS at 13:31

## 2023-01-14 RX ADMIN — ONDANSETRON 4 MG: 2 INJECTION INTRAMUSCULAR; INTRAVENOUS at 16:27

## 2023-01-14 RX ADMIN — ACETAMINOPHEN 650 MG: 325 TABLET, FILM COATED ORAL at 13:27

## 2023-01-14 RX ADMIN — MORPHINE SULFATE 2 MG: 2 INJECTION, SOLUTION INTRAMUSCULAR; INTRAVENOUS at 08:33

## 2023-01-14 RX ADMIN — ENOXAPARIN SODIUM 40 MG: 100 INJECTION SUBCUTANEOUS at 17:05

## 2023-01-14 RX ADMIN — HYDROCODONE BITARTRATE AND ACETAMINOPHEN 1 TABLET: 5; 325 TABLET ORAL at 17:05

## 2023-01-14 RX ADMIN — PIPERACILLIN AND TAZOBACTAM 3.38 G: 3; .375 INJECTION, POWDER, FOR SOLUTION INTRAVENOUS at 21:37

## 2023-01-14 RX ADMIN — ACETAMINOPHEN 650 MG: 325 TABLET, FILM COATED ORAL at 08:33

## 2023-01-14 RX ADMIN — MORPHINE SULFATE 2 MG: 2 INJECTION, SOLUTION INTRAMUSCULAR; INTRAVENOUS at 01:46

## 2023-01-14 RX ADMIN — MORPHINE SULFATE 2 MG: 2 INJECTION, SOLUTION INTRAMUSCULAR; INTRAVENOUS at 05:21

## 2023-01-14 RX ADMIN — ONDANSETRON 4 MG: 2 INJECTION INTRAMUSCULAR; INTRAVENOUS at 08:32

## 2023-01-14 RX ADMIN — SODIUM CHLORIDE, POTASSIUM CHLORIDE, SODIUM LACTATE AND CALCIUM CHLORIDE 200 ML/HR: 600; 310; 30; 20 INJECTION, SOLUTION INTRAVENOUS at 05:22

## 2023-01-14 RX ADMIN — Medication 10 ML: at 08:33

## 2023-01-14 RX ADMIN — ACETAMINOPHEN 650 MG: 325 TABLET, FILM COATED ORAL at 20:07

## 2023-01-14 NOTE — PROGRESS NOTES
GENERAL SURGERY PROGRESS NOTE  Chief Complaint:  Surgery Follow up   LOS: 1 day       Subjective     Interval History:     Feels improved from yesterday. Still with some tachycardia and low grade temp overnight. Complains of lower abdominal pain and feelings of bladder fullness    Objective     Vital Signs  Temp:  [97.6 °F (36.4 °C)-100.4 °F (38 °C)] 99.3 °F (37.4 °C)  Heart Rate:  [] 97  Resp:  [14-22] 16  BP: (109-139)/(55-83) 109/66    Physical Exam:   Abdomen soft, nontender across upper abdomen, lower abdomen tender  Labs:  Lab Results (last 24 hours)     Procedure Component Value Units Date/Time    CBC Auto Differential [[id number]]  (Abnormal) Collected: 01/14/23 0549    Specimen: Blood Updated: 01/14/23 0639     WBC 10.20 10*3/mm3      RBC 4.06 10*6/mm3      Hemoglobin 12.4 g/dL      Comment: Result checked          Hematocrit 36.2 %      MCV 89.0 fL      MCH 30.4 pg      MCHC 34.2 g/dL      RDW 12.4 %      RDW-SD 40.7 fl      MPV 7.3 fL      Platelets 225 10*3/mm3      Neutrophil % 87.7 %      Lymphocyte % 6.8 %      Monocyte % 5.1 %      Eosinophil % 0.3 %      Basophil % 0.1 %      Neutrophils, Absolute 9.00 10*3/mm3      Lymphocytes, Absolute 0.70 10*3/mm3      Monocytes, Absolute 0.50 10*3/mm3      Eosinophils, Absolute 0.00 10*3/mm3      Basophils, Absolute 0.00 10*3/mm3      nRBC 0.0 /100 WBC     Basic Metabolic Panel [[id number]]  (Abnormal) Collected: 01/14/23 0549    Specimen: Blood Updated: 01/14/23 0628     Glucose 111 mg/dL      BUN 18 mg/dL      Creatinine 0.74 mg/dL      Sodium 140 mmol/L      Potassium 4.5 mmol/L      Chloride 105 mmol/L      CO2 28.0 mmol/L      Calcium 8.9 mg/dL      BUN/Creatinine Ratio 24.3     Anion Gap 7.0 mmol/L      eGFR 96.9 mL/min/1.73      Comment: National Kidney Foundation and American Society of Nephrology (ASN) Task Force recommended calculation based on the Chronic Kidney Disease Epidemiology Collaboration (CKD-EPI) equation refit without adjustment for  race.       Narrative:      GFR Normal >60  Chronic Kidney Disease <60  Kidney Failure <15      Lactic Acid, Plasma [[id number]]  (Normal) Collected: 01/14/23 0549    Specimen: Blood Updated: 01/14/23 0618     Lactate 0.8 mmol/L     Urinalysis, Microscopic Only - Urine, Clean Catch [[id number]]  (Abnormal) Collected: 01/14/23 0434    Specimen: Urine, Clean Catch Updated: 01/14/23 0450     RBC, UA 0-2 /HPF      WBC, UA 0-2 /HPF      Bacteria, UA None Seen /HPF      Squamous Epithelial Cells, UA 7-12 /HPF      Hyaline Casts, UA 0-2 /LPF      Methodology Automated Microscopy    Urinalysis With Microscopic If Indicated (No Culture) - Urine, Clean Catch [[id number]]  (Abnormal) Collected: 01/14/23 0434    Specimen: Urine, Clean Catch Updated: 01/14/23 0450     Color, UA Dark Yellow     Appearance, UA Cloudy     pH, UA 5.5     Specific Gravity, UA 1.034     Glucose, UA Negative     Ketones, UA Negative     Bilirubin, UA Negative     Blood, UA Negative     Protein, UA 30 mg/dL (1+)     Leuk Esterase, UA Trace     Nitrite, UA Positive     Urobilinogen, UA 1.0 E.U./dL    STAT Lactic Acid, Reflex [[id number]]  (Abnormal) Collected: 01/13/23 1124    Specimen: Blood Updated: 01/13/23 1150     Lactate 3.2 mmol/L     Chlamydia trachomatis, Neisseria gonorrhoeae, PCR - Swab, Urine, Random Void [[id number]]  (Normal) Collected: 01/13/23 0908    Specimen: Swab from Urine, Random Void Updated: 01/13/23 1052     Chlamydia DNA by PCR Not Detected     Neisseria gonorrhoeae by PCR Not Detected    Pregnancy, Urine - Urine, Clean Catch [[id number]]  (Normal) Collected: 01/13/23 0908    Specimen: Urine, Clean Catch Updated: 01/13/23 0919     HCG, Urine QL Negative    Lipase [[id number]]  (Normal) Collected: 01/13/23 0729    Specimen: Blood Updated: 01/13/23 0908     Lipase 25 U/L     Wet Prep, Genital - Swab, Vagina [[id number]]  (Normal) Collected: 01/13/23 0831    Specimen: Swab from Vagina Updated: 01/13/23 0842     YEAST No yeast seen      HYPHAL ELEMENTS No Hyphal elements seen     WBC'S No WBC's seen     Clue Cells, Wet Prep No Clue cells seen     Trichomonas, Wet Prep No Trichomonas seen    [facility] [[id number]] Collected: 01/13/23 0729    Specimen: Blood Updated: 01/13/23 0830    Narrative:      The following orders were created for panel order [facility].  Procedure                               Abnormality         Status                     ---------                               -----------         ------                     Green Top (Gel)[[id number]]                                  Final result               Lavender Top[[id number]]                                     Final result               Gold Top - SST[[id number]]                                   Final result               Light Blue Top[[id number]]                                   Final result                 Please view results for these tests on the individual orders.    Green Top (Gel) [[id number]] Collected: 01/13/23 0729    Specimen: Blood Updated: 01/13/23 0830     Extra Tube Hold for add-ons.     Comment: Auto resulted.       Gold Top - SST [[id number]] Collected: 01/13/23 0729    Specimen: Blood Updated: 01/13/23 0830     Extra Tube Hold for add-ons.     Comment: Auto resulted.       Lavender Top [[id number]] Collected: 01/13/23 0729    Specimen: Blood Updated: 01/13/23 0830     Extra Tube hold for add-on     Comment: Auto resulted       Light Blue Top [[id number]] Collected: 01/13/23 0729    Specimen: Blood Updated: 01/13/23 0830     Extra Tube Hold for add-ons.     Comment: Auto resulted       Blood Culture - Blood, Arm, Left [[id number]] Collected: 01/13/23 0814    Specimen: Blood from Arm, Left Updated: 01/13/23 0824    Comprehensive Metabolic Panel [[id number]]  (Abnormal) Collected: 01/13/23 0729    Specimen: Blood Updated: 01/13/23 0808     Glucose 150 mg/dL      BUN 13 mg/dL      Creatinine 0.74 mg/dL      Sodium 137 mmol/L      Potassium 3.6 mmol/L      Comment: Slight  hemolysis detected by analyzer. Results may be affected.        Chloride 100 mmol/L      CO2 21.0 mmol/L      Calcium 9.6 mg/dL      Total Protein 7.5 g/dL      Albumin 4.8 g/dL      ALT (SGPT) 57 U/L      AST (SGOT) 42 U/L      Alkaline Phosphatase 78 U/L      Total Bilirubin 0.9 mg/dL      Globulin 2.7 gm/dL      A/G Ratio 1.8 g/dL      BUN/Creatinine Ratio 17.6     Anion Gap 16.0 mmol/L      eGFR 96.9 mL/min/1.73      Comment: National Kidney Foundation and American Society of Nephrology (ASN) Task Force recommended calculation based on the Chronic Kidney Disease Epidemiology Collaboration (CKD-EPI) equation refit without adjustment for race.       Narrative:      GFR Normal >60  Chronic Kidney Disease <60  Kidney Failure <15      CBC & Differential [[id number]]  (Abnormal) Collected: 01/13/23 0729    Specimen: Blood Updated: 01/13/23 0753    Narrative:      The following orders were created for panel order CBC & Differential.  Procedure                               Abnormality         Status                     ---------                               -----------         ------                     CBC Auto Differential[[id number]]        Abnormal            Final result                 Please view results for these tests on the individual orders.    CBC Auto Differential [[id number]]  (Abnormal) Collected: 01/13/23 0729    Specimen: Blood Updated: 01/13/23 0753     WBC 14.60 10*3/mm3      RBC 5.15 10*6/mm3      Hemoglobin 15.3 g/dL      Hematocrit 45.8 %      MCV 89.0 fL      MCH 29.7 pg      MCHC 33.4 g/dL      RDW 12.2 %      RDW-SD 38.1 fl      MPV 7.4 fL      Platelets 364 10*3/mm3      Neutrophil % 88.8 %      Lymphocyte % 6.7 %      Monocyte % 4.3 %      Eosinophil % 0.0 %      Basophil % 0.2 %      Neutrophils, Absolute 12.90 10*3/mm3      Lymphocytes, Absolute 1.00 10*3/mm3      Monocytes, Absolute 0.60 10*3/mm3      Eosinophils, Absolute 0.00 10*3/mm3      Basophils, Absolute 0.00 10*3/mm3      nRBC 0.0  /100 WBC     Blood Culture - Blood, Arm, Right [[id number]] Collected: 01/13/23 0729    Specimen: Blood from Arm, Right Updated: 01/13/23 0743    POC Lactate [[id number]]  (Abnormal) Collected: 01/13/23 0737    Specimen: Blood Updated: 01/13/23 0738     Lactate 2.3 mmol/L      Comment: Serial Number: [id number]:  [id number]              Results Review:     Labs and imaging for today were reviewed.    Assessment & Plan     Opal Morales is a 52 y.o. female who has diverticulitis with small amounts of extra-luminal air      Clinically she appears improved and her WBC has normalized.  Her pain is significantly better.  I discussed with her again the possibility of surgery vs. Continued Abx.  As she feels improved will plan to continue ABx and observation.  Will allow clears today.  Certainly, if she has increased pain, fever, etc surgery would still possibly be needed.          This document has been electronically signed by Emerson Power MD on January 14, 2023 07:07 OSMAR Power MD  01/14/23  07:07 EST

## 2023-01-14 NOTE — PROGRESS NOTES
[facility] Medicine Services Daily Progress Note    Patient Name: Opal Morales  : 1970  MRN: [id number]  Primary Care Physician:  Keturah Harp MD  Date of admission: 2023      Subjective      Chief Complaint: Abdominal pain      Patient Reports  2023  Patient stated abdominal pain improving  Denies any nausea/vomiting  Tolerating antibiotics  Afebrile    Review of Systems   Constitutional: Negative for chills and fever.   HENT: Negative for congestion.    Eyes: Negative for blurred vision.   Cardiovascular: Negative for chest pain.   Respiratory: Negative for shortness of breath.    Endocrine: Negative for cold intolerance and heat intolerance.   Gastrointestinal: Positive for abdominal pain. Negative for nausea and vomiting.   Genitourinary: Negative for flank pain.   Neurological: Negative for focal weakness.   Psychiatric/Behavioral: Negative for altered mental status.          Objective      Vitals:   Temp:  [97.6 °F (36.4 °C)-100.4 °F (38 °C)] 98.7 °F (37.1 °C)  Heart Rate:  [] 96  Resp:  [14-18] 16  BP: (109-136)/(55-78) 121/68    Physical Exam  Vitals reviewed.   Constitutional:       General: She is not in acute distress.  HENT:      Head: Normocephalic.      Nose: Nose normal.      Mouth/Throat:      Mouth: Mucous membranes are moist.   Eyes:      Extraocular Movements: Extraocular movements intact.      Conjunctiva/sclera: Conjunctivae normal.      Pupils: Pupils are equal, round, and reactive to light.   Cardiovascular:      Rate and Rhythm: Normal rate and regular rhythm.   Pulmonary:      Effort: No respiratory distress.      Breath sounds: Normal breath sounds.   Abdominal:      General: Bowel sounds are normal.      Tenderness: There is abdominal tenderness (Lower abdomen). There is no guarding or rebound.   Musculoskeletal:         General: Normal range of motion.      Cervical back: Neck supple.   Skin:     General: Skin is warm and dry.    Neurological:      Mental Status: She is alert and oriented to person, place, and time.   Psychiatric:         Mood and Affect: Mood normal.            Result Review    Result Review:  I have personally reviewed the results from the time of this admission to 1/14/2023 13:21 EST and agree with these findings:  [x]  Laboratory  [x]  Microbiology  [x]  Radiology  []  EKG/Telemetry   []  Cardiology/Vascular   []  Pathology  []  Old records  []  Other:  Most notable findings include:           Assessment & Plan      Brief Patient Summary:    52-year-old female presented to [facility] ED on 1/13/2023 with complaints of lower abdominal pain associated with nausea but no vomiting.  CT abdomen/pelvis done showed focal acute perforated sigmoid colon.  Patient was started on antibiotics and general surgeon consulted.      enoxaparin, 40 mg, Subcutaneous, Q24H  piperacillin-tazobactam, 3.375 g, Intravenous, Q8H  sodium chloride, 10 mL, Intravenous, Q12H       lactated ringers, 100 mL/hr, Last Rate: 100 mL/hr (01/14/23 1016)         Active Hospital Problems:  Active Hospital Problems    Diagnosis    • **Diverticulitis of colon with perforation    • Anxiety      Plan:     Acute perforated sigmoid diverticulitis  CT a/p: focal acute perforated sigmoid diverticulitis.  Small free air within the abdomen pelvis.  Small air-fluid pocket in the pelvis which does not appear to represent a well-formed abscess at this time.  Trace pelvic free fluid.    WBC 14.6, lactate 2.3, , not hypotensive-on presentation  Continue on conservative managements with antibiotics-on Zosyn  Also on antiemetic and analgesic.  General surgeon following and recommending continuing antibiotics for now- surgery will be considered if patient clinically deteriorates  Patient started on clear liquid diet today     Anxiety  On Wellbutrin     Seasonal allergies  On zyrtec       DVT prophylaxis:  Medical and mechanical DVT prophylaxis orders are  present.    CODE STATUS:    Level Of Support Discussed With: Patient  Code Status (Patient has no pulse and is not breathing): CPR (Attempt to Resuscitate)  Medical Interventions (Patient has pulse or is breathing): Full Support      Disposition: Pending clinical progress    This patient has been examined with appropriate PPE . 01/14/23      Electronically signed by Matthew Phillips MD, 01/14/23, 13:21 EST.  [place] Hospitalist Team

## 2023-01-15 LAB
ANION GAP SERPL CALCULATED.3IONS-SCNC: 10 MMOL/L (ref 5–15)
BASOPHILS # BLD AUTO: 0 10*3/MM3 (ref 0–0.2)
BASOPHILS NFR BLD AUTO: 0.2 % (ref 0–1.5)
BUN SERPL-MCNC: 11 MG/DL (ref 6–20)
BUN/CREAT SERPL: 18.6 (ref 7–25)
CALCIUM SPEC-SCNC: 8.8 MG/DL (ref 8.6–10.5)
CHLORIDE SERPL-SCNC: 105 MMOL/L (ref 98–107)
CO2 SERPL-SCNC: 24 MMOL/L (ref 22–29)
CREAT SERPL-MCNC: 0.59 MG/DL (ref 0.57–1)
DEPRECATED RDW RBC AUTO: 40.7 FL (ref 37–54)
EGFRCR SERPLBLD CKD-EPI 2021: 107.9 ML/MIN/1.73
EOSINOPHIL # BLD AUTO: 0 10*3/MM3 (ref 0–0.4)
EOSINOPHIL NFR BLD AUTO: 0.4 % (ref 0.3–6.2)
ERYTHROCYTE [DISTWIDTH] IN BLOOD BY AUTOMATED COUNT: 12.4 % (ref 12.3–15.4)
GLUCOSE SERPL-MCNC: 88 MG/DL (ref 65–99)
HCT VFR BLD AUTO: 32.5 % (ref 34–46.6)
HGB BLD-MCNC: 11 G/DL (ref 12–15.9)
LYMPHOCYTES # BLD AUTO: 0.7 10*3/MM3 (ref 0.7–3.1)
LYMPHOCYTES NFR BLD AUTO: 8.2 % (ref 19.6–45.3)
MCH RBC QN AUTO: 30.3 PG (ref 26.6–33)
MCHC RBC AUTO-ENTMCNC: 33.9 G/DL (ref 31.5–35.7)
MCV RBC AUTO: 89.4 FL (ref 79–97)
MONOCYTES # BLD AUTO: 0.4 10*3/MM3 (ref 0.1–0.9)
MONOCYTES NFR BLD AUTO: 5.1 % (ref 5–12)
NEUTROPHILS NFR BLD AUTO: 7.2 10*3/MM3 (ref 1.7–7)
NEUTROPHILS NFR BLD AUTO: 86.1 % (ref 42.7–76)
NRBC BLD AUTO-RTO: 0 /100 WBC (ref 0–0.2)
PLATELET # BLD AUTO: 208 10*3/MM3 (ref 140–450)
PMV BLD AUTO: 7.4 FL (ref 6–12)
POTASSIUM SERPL-SCNC: 4.1 MMOL/L (ref 3.5–5.2)
RBC # BLD AUTO: 3.64 10*6/MM3 (ref 3.77–5.28)
SODIUM SERPL-SCNC: 139 MMOL/L (ref 136–145)
WBC NRBC COR # BLD: 8.4 10*3/MM3 (ref 3.4–10.8)

## 2023-01-15 PROCEDURE — 80048 BASIC METABOLIC PNL TOTAL CA: CPT | Performed by: SURGERY

## 2023-01-15 PROCEDURE — 25010000002 ENOXAPARIN PER 10 MG: Performed by: INTERNAL MEDICINE

## 2023-01-15 PROCEDURE — 25010000002 PIPERACILLIN SOD-TAZOBACTAM PER 1 G: Performed by: NURSE PRACTITIONER

## 2023-01-15 PROCEDURE — 99232 SBSQ HOSP IP/OBS MODERATE 35: CPT | Performed by: SURGERY

## 2023-01-15 PROCEDURE — 36415 COLL VENOUS BLD VENIPUNCTURE: CPT | Performed by: SURGERY

## 2023-01-15 PROCEDURE — 25010000002 ONDANSETRON PER 1 MG: Performed by: NURSE PRACTITIONER

## 2023-01-15 PROCEDURE — 85025 COMPLETE CBC W/AUTO DIFF WBC: CPT | Performed by: SURGERY

## 2023-01-15 RX ORDER — IBUPROFEN 400 MG/1
600 TABLET ORAL ONCE
Status: COMPLETED | OUTPATIENT
Start: 2023-01-15 | End: 2023-01-15

## 2023-01-15 RX ORDER — BUTALBITAL, ACETAMINOPHEN AND CAFFEINE 50; 325; 40 MG/1; MG/1; MG/1
1 TABLET ORAL EVERY 4 HOURS PRN
Status: DISCONTINUED | OUTPATIENT
Start: 2023-01-15 | End: 2023-01-16 | Stop reason: HOSPADM

## 2023-01-15 RX ORDER — CETIRIZINE HYDROCHLORIDE 10 MG/1
10 TABLET ORAL DAILY
Status: DISCONTINUED | OUTPATIENT
Start: 2023-01-15 | End: 2023-01-16 | Stop reason: HOSPADM

## 2023-01-15 RX ADMIN — PIPERACILLIN AND TAZOBACTAM 3.38 G: 3; .375 INJECTION, POWDER, FOR SOLUTION INTRAVENOUS at 07:43

## 2023-01-15 RX ADMIN — SODIUM CHLORIDE, POTASSIUM CHLORIDE, SODIUM LACTATE AND CALCIUM CHLORIDE 100 ML/HR: 600; 310; 30; 20 INJECTION, SOLUTION INTRAVENOUS at 07:44

## 2023-01-15 RX ADMIN — Medication 10 ML: at 09:33

## 2023-01-15 RX ADMIN — IBUPROFEN 600 MG: 400 TABLET, FILM COATED ORAL at 10:46

## 2023-01-15 RX ADMIN — ACETAMINOPHEN 650 MG: 325 TABLET, FILM COATED ORAL at 00:53

## 2023-01-15 RX ADMIN — CETIRIZINE HYDROCHLORIDE 10 MG: 10 TABLET, FILM COATED ORAL at 10:41

## 2023-01-15 RX ADMIN — ENOXAPARIN SODIUM 40 MG: 100 INJECTION SUBCUTANEOUS at 15:13

## 2023-01-15 RX ADMIN — ONDANSETRON 4 MG: 2 INJECTION INTRAMUSCULAR; INTRAVENOUS at 09:32

## 2023-01-15 RX ADMIN — BUTALBITAL, ACETAMINOPHEN, AND CAFFEINE 1 TABLET: 50; 325; 40 TABLET ORAL at 20:56

## 2023-01-15 RX ADMIN — Medication 10 ML: at 21:00

## 2023-01-15 RX ADMIN — PIPERACILLIN AND TAZOBACTAM 3.38 G: 3; .375 INJECTION, POWDER, FOR SOLUTION INTRAVENOUS at 15:12

## 2023-01-15 RX ADMIN — ACETAMINOPHEN 650 MG: 325 TABLET, FILM COATED ORAL at 06:09

## 2023-01-15 RX ADMIN — BUTALBITAL, ACETAMINOPHEN, AND CAFFEINE 1 TABLET: 50; 325; 40 TABLET ORAL at 10:41

## 2023-01-15 RX ADMIN — PIPERACILLIN AND TAZOBACTAM 3.38 G: 3; .375 INJECTION, POWDER, FOR SOLUTION INTRAVENOUS at 23:03

## 2023-01-15 NOTE — PROGRESS NOTES
[facility] Medicine Services Daily Progress Note    Patient Name: Opal Morales  : 1970  MRN: [id number]  Primary Care Physician:  Keturah Harp MD  Date of admission: 2023      Subjective      Chief Complaint: Abdominal pain      Patient Reports  2023  Patient stated abdominal pain improving  Denies any nausea/vomiting  Tolerating antibiotics  Afebrile    1/15/2023  Patient seen and examined  Complaining of headache not relieved with Tylenol- was given ibuprofen x1 and Fioricet.  Abdominal pain significantly improving.  Had a bowel movement last night and tolerating clears  Remains afebrile    Review of Systems   Constitutional: Negative for chills and fever.   HENT: Negative for congestion.    Eyes: Negative for blurred vision.   Cardiovascular: Negative for chest pain.   Respiratory: Negative for shortness of breath.    Endocrine: Negative for cold intolerance and heat intolerance.   Gastrointestinal: Positive for abdominal pain. Negative for nausea and vomiting.   Genitourinary: Negative for flank pain.   Neurological: Negative for focal weakness.   Psychiatric/Behavioral: Negative for altered mental status.          Objective      Vitals:   Temp:  [98.2 °F (36.8 °C)-99.9 °F (37.7 °C)] 98.2 °F (36.8 °C)  Heart Rate:  [] 86  Resp:  [20-29] 20  BP: (123-141)/(61-75) 141/75    Physical Exam  Vitals reviewed.   Constitutional:       General: She is not in acute distress.  HENT:      Head: Normocephalic.      Nose: Nose normal.      Mouth/Throat:      Mouth: Mucous membranes are moist.   Eyes:      Extraocular Movements: Extraocular movements intact.      Conjunctiva/sclera: Conjunctivae normal.      Pupils: Pupils are equal, round, and reactive to light.   Cardiovascular:      Rate and Rhythm: Normal rate and regular rhythm.   Pulmonary:      Effort: No respiratory distress.      Breath sounds: Normal breath sounds.   Abdominal:      General: Bowel sounds  are normal.      Tenderness: There is abdominal tenderness (Lower abdomen). There is no guarding or rebound.   Musculoskeletal:         General: Normal range of motion.      Cervical back: Neck supple.   Skin:     General: Skin is warm and dry.   Neurological:      Mental Status: She is alert and oriented to person, place, and time.   Psychiatric:         Mood and Affect: Mood normal.            Result Review    Result Review:  I have personally reviewed the results from the time of this admission to 1/15/2023 15:08 EST and agree with these findings:  [x]  Laboratory  [x]  Microbiology  [x]  Radiology  []  EKG/Telemetry   []  Cardiology/Vascular   []  Pathology  []  Old records  []  Other:  Most notable findings include:           Assessment & Plan      Brief Patient Summary:    52-year-old female presented to [facility] ED on 1/13/2023 with complaints of lower abdominal pain associated with nausea but no vomiting.  CT abdomen/pelvis done showed focal acute perforated sigmoid colon.  Patient was started on antibiotics and general surgeon consulted.      cetirizine, 10 mg, Oral, Daily  enoxaparin, 40 mg, Subcutaneous, Q24H  piperacillin-tazobactam, 3.375 g, Intravenous, Q8H  sodium chloride, 10 mL, Intravenous, Q12H             Active Hospital Problems:  Active Hospital Problems    Diagnosis    • **Diverticulitis of colon with perforation    • Anxiety      Plan:     Acute perforated sigmoid diverticulitis  CT a/p: focal acute perforated sigmoid diverticulitis.  Small free air within the abdomen pelvis.  Small air-fluid pocket in the pelvis which does not appear to represent a well-formed abscess at this time.  Trace pelvic free fluid.    WBC 14.6, lactate 2.3, , not hypotensive-on presentation  Continue on conservative managements with antibiotics-on Zosyn  Also on antiemetic and analgesic.  General surgeon following and recommending continuing antibiotics for now- surgery will be considered if  patient clinically deteriorates  Diet advance to full use and to increase as tolerated.  If patient continues doing okay will be discharged tomorrow to follow-up with general surgeon in 1 to 2 weeks as outpatient     Anxiety  On Wellbutrin     Seasonal allergies  On zyrtec       DVT prophylaxis:  Medical and mechanical DVT prophylaxis orders are present.    CODE STATUS:    Level Of Support Discussed With: Patient  Code Status (Patient has no pulse and is not breathing): CPR (Attempt to Resuscitate)  Medical Interventions (Patient has pulse or is breathing): Full Support      Disposition: Pending clinical progress    This patient has been examined with appropriate PPE . 01/15/23      Electronically signed by Matthew Phillips MD, 01/15/23, 15:08 EST.  [facility] Hospitalist Team

## 2023-01-15 NOTE — PLAN OF CARE
Goal Outcome Evaluation:  Plan of Care Reviewed With: patient tolerating clear liquids diet. No complaints of pain pt up in room ad larissa        Progress: improving

## 2023-01-15 NOTE — PROGRESS NOTES
GENERAL SURGERY PROGRESS NOTE  Chief Complaint:  Surgery Follow up   LOS: 2 days       Subjective     Interval History:     Feels significantly better.  Had a bowel movement overnight.  Has been tolerating clears.  Main complaint today is a headache.  This has been unrelieved by plain Tylenol.    Objective     Vital Signs  Temp:  [98.3 °F (36.8 °C)-99.9 °F (37.7 °C)] 98.3 °F (36.8 °C)  Heart Rate:  [] 98  Resp:  [21-29] 23  BP: (121-135)/(61-69) 123/65    Physical Exam:   Abdomen soft, minimally uncomfortable across lower abdomen  Labs:  Lab Results (last 24 hours)     Procedure Component Value Units Date/Time    Blood Culture - Blood, Arm, Left [[id number]]  (Normal) Collected: 01/13/23 0814    Specimen: Blood from Arm, Left Updated: 01/15/23 0830     Blood Culture No growth at 2 days    Blood Culture - Blood, Arm, Right [[id number]]  (Normal) Collected: 01/13/23 0729    Specimen: Blood from Arm, Right Updated: 01/15/23 0745     Blood Culture No growth at 2 days    Basic Metabolic Panel [[id number]]  (Normal) Collected: 01/15/23 0600    Specimen: Blood Updated: 01/15/23 0735     Glucose 88 mg/dL      BUN 11 mg/dL      Creatinine 0.59 mg/dL      Sodium 139 mmol/L      Potassium 4.1 mmol/L      Chloride 105 mmol/L      CO2 24.0 mmol/L      Calcium 8.8 mg/dL      BUN/Creatinine Ratio 18.6     Anion Gap 10.0 mmol/L      eGFR 107.9 mL/min/1.73      Comment: National Kidney Foundation and American Society of Nephrology (ASN) Task Force recommended calculation based on the Chronic Kidney Disease Epidemiology Collaboration (CKD-EPI) equation refit without adjustment for race.       Narrative:      GFR Normal >60  Chronic Kidney Disease <60  Kidney Failure <15      CBC & Differential [[id number]]  (Abnormal) Collected: 01/15/23 0600    Specimen: Blood Updated: 01/15/23 0716    Narrative:      The following orders were created for panel order CBC & Differential.  Procedure                               Abnormality          Status                     ---------                               -----------         ------                     CBC Auto Differential[[id number]]        Abnormal            Final result                 Please view results for these tests on the individual orders.    CBC Auto Differential [[id number]]  (Abnormal) Collected: 01/15/23 0600    Specimen: Blood Updated: 01/15/23 0716     WBC 8.40 10*3/mm3      RBC 3.64 10*6/mm3      Hemoglobin 11.0 g/dL      Hematocrit 32.5 %      MCV 89.4 fL      MCH 30.3 pg      MCHC 33.9 g/dL      RDW 12.4 %      RDW-SD 40.7 fl      MPV 7.4 fL      Platelets 208 10*3/mm3      Neutrophil % 86.1 %      Lymphocyte % 8.2 %      Monocyte % 5.1 %      Eosinophil % 0.4 %      Basophil % 0.2 %      Neutrophils, Absolute 7.20 10*3/mm3      Lymphocytes, Absolute 0.70 10*3/mm3      Monocytes, Absolute 0.40 10*3/mm3      Eosinophils, Absolute 0.00 10*3/mm3      Basophils, Absolute 0.00 10*3/mm3      nRBC 0.0 /100 WBC            Results Review:     Labs and imaging for today were reviewed.    Assessment & Plan     Opal Morales is a 52 y.o. female who has resolving diverticulitis      Continue antibiotics.  White blood cell count remains normal.  Advance diet to full's and as tolerated thereafter.  Fioricet added for headache.  Currently the patient is very interested in going home today.  I believe that is not entirely unreasonable if she is able to tolerate regular diet without significant increase in her pain.  She will need to go home on oral antibiotics.  She will need to see me in 1 to 2 weeks post discharge for continued follow-up.            This document has been electronically signed by Emerson Power MD on January 15, 2023 10:30 EST        Emerson Power MD  01/15/23  10:30 EST

## 2023-01-16 ENCOUNTER — READMISSION MANAGEMENT (OUTPATIENT)
Dept: CALL CENTER | Facility: HOSPITAL | Age: 53
End: 2023-01-16
Payer: COMMERCIAL

## 2023-01-16 VITALS
SYSTOLIC BLOOD PRESSURE: 128 MMHG | DIASTOLIC BLOOD PRESSURE: 72 MMHG | WEIGHT: 179.6 LBS | HEART RATE: 92 BPM | BODY MASS INDEX: 29.92 KG/M2 | HEIGHT: 65 IN | RESPIRATION RATE: 20 BRPM | TEMPERATURE: 99.5 F | OXYGEN SATURATION: 98 %

## 2023-01-16 LAB
BASOPHILS # BLD AUTO: 0 10*3/MM3 (ref 0–0.2)
BASOPHILS NFR BLD AUTO: 0.2 % (ref 0–1.5)
DEPRECATED RDW RBC AUTO: 40.7 FL (ref 37–54)
EOSINOPHIL # BLD AUTO: 0 10*3/MM3 (ref 0–0.4)
EOSINOPHIL NFR BLD AUTO: 0.3 % (ref 0.3–6.2)
ERYTHROCYTE [DISTWIDTH] IN BLOOD BY AUTOMATED COUNT: 12.3 % (ref 12.3–15.4)
HCT VFR BLD AUTO: 33.9 % (ref 34–46.6)
HGB BLD-MCNC: 11.6 G/DL (ref 12–15.9)
LYMPHOCYTES # BLD AUTO: 0.7 10*3/MM3 (ref 0.7–3.1)
LYMPHOCYTES NFR BLD AUTO: 8.9 % (ref 19.6–45.3)
MCH RBC QN AUTO: 30.6 PG (ref 26.6–33)
MCHC RBC AUTO-ENTMCNC: 34.1 G/DL (ref 31.5–35.7)
MCV RBC AUTO: 89.6 FL (ref 79–97)
MONOCYTES # BLD AUTO: 0.5 10*3/MM3 (ref 0.1–0.9)
MONOCYTES NFR BLD AUTO: 5.5 % (ref 5–12)
NEUTROPHILS NFR BLD AUTO: 7 10*3/MM3 (ref 1.7–7)
NEUTROPHILS NFR BLD AUTO: 85.1 % (ref 42.7–76)
NRBC BLD AUTO-RTO: 0 /100 WBC (ref 0–0.2)
PLATELET # BLD AUTO: 252 10*3/MM3 (ref 140–450)
PMV BLD AUTO: 7.5 FL (ref 6–12)
RBC # BLD AUTO: 3.78 10*6/MM3 (ref 3.77–5.28)
WBC NRBC COR # BLD: 8.2 10*3/MM3 (ref 3.4–10.8)

## 2023-01-16 PROCEDURE — 25010000002 PIPERACILLIN SOD-TAZOBACTAM PER 1 G: Performed by: NURSE PRACTITIONER

## 2023-01-16 PROCEDURE — 85025 COMPLETE CBC W/AUTO DIFF WBC: CPT | Performed by: INTERNAL MEDICINE

## 2023-01-16 PROCEDURE — 99231 SBSQ HOSP IP/OBS SF/LOW 25: CPT | Performed by: SURGERY

## 2023-01-16 RX ORDER — HYDROCODONE BITARTRATE AND ACETAMINOPHEN 5; 325 MG/1; MG/1
1 TABLET ORAL EVERY 4 HOURS PRN
Qty: 18 TABLET | Refills: 0 | Status: SHIPPED | OUTPATIENT
Start: 2023-01-16 | End: 2023-01-19

## 2023-01-16 RX ORDER — FLUCONAZOLE 150 MG/1
150 TABLET ORAL ONCE
Qty: 1 TABLET | Refills: 0 | Status: SHIPPED | OUTPATIENT
Start: 2023-01-16 | End: 2023-01-16

## 2023-01-16 RX ORDER — AMOXICILLIN AND CLAVULANATE POTASSIUM 875; 125 MG/1; MG/1
1 TABLET, FILM COATED ORAL 2 TIMES DAILY
Qty: 20 TABLET | Refills: 0 | Status: SHIPPED | OUTPATIENT
Start: 2023-01-16 | End: 2023-01-26

## 2023-01-16 RX ADMIN — Medication 10 ML: at 08:22

## 2023-01-16 RX ADMIN — CETIRIZINE HYDROCHLORIDE 10 MG: 10 TABLET, FILM COATED ORAL at 08:22

## 2023-01-16 RX ADMIN — PIPERACILLIN AND TAZOBACTAM 3.38 G: 3; .375 INJECTION, POWDER, FOR SOLUTION INTRAVENOUS at 05:55

## 2023-01-16 NOTE — CASE MANAGEMENT/SOCIAL WORK
Discharge Planning Assessment  [facility]     Patient Name: Opal Morales  MRN: [id number]  Today's Date: 1/16/2023    Admit Date: 1/13/2023    Plan: D/C plan: Anticipate home with family   Discharge Needs Assessment     Row Name 01/16/23 1037       Living Environment    People in Home spouse;child(qi), adult    Name(s) of People in Home  Jethro, Son Eran, son Rahul (away at college)    Current Living Arrangements home    Primary Care Provided by self    Provides Primary Care For no one    Family Caregiver if Needed spouse    Family Caregiver Names Jethro    Quality of Family Relationships unable to assess  No family present during CM rounds    Able to Return to Prior Arrangements yes       Resource/Environmental Concerns    Resource/Environmental Concerns none    Transportation Concerns none       Transition Planning    Patient/Family Anticipates Transition to home with family    Patient/Family Anticipated Services at Transition none    Transportation Anticipated family or friend will provide       Discharge Needs Assessment    Readmission Within the Last 30 Days no previous admission in last 30 days    Equipment Currently Used at Home none    Concerns to be Addressed no discharge needs identified;denies needs/concerns at this time    Anticipated Changes Related to Illness none    Equipment Needed After Discharge none    Provided Post Acute Provider List? N/A    N/A Provider List Comment Anticipate home               Discharge Plan     Row Name 01/16/23 1039       Plan    Plan D/C plan: Anticipate home with family    Patient/Family in Agreement with Plan yes    Plan Comments Met with pt at bedside. Pt lives at home with  Jethro and oldest son Eran (24). Son Rahul lives at home part time, is away at college. Rahul to provide transportation at time of d/c. Pt is IADL, including driving. Denies use of DME at home. PCP and pharmacy confirmed. No financial barriers to meds. No use of HHC. No d/c needs  identified at this time. Barrier to d/c: IV abx.                    Demographic Summary     Row Name 01/16/23 1037       General Information    Admission Type inpatient    Arrived From emergency department    Referral Source admission list    Reason for Consult discharge planning    Preferred Language English               Functional Status     Row Name 01/16/23 1037       Functional Status    Usual Activity Tolerance good    Current Activity Tolerance moderate       Functional Status, IADL    Medications independent    Meal Preparation independent    Housekeeping independent    Laundry independent    Shopping independent                     Met with patient in room wearing PPE: mask  Maintained distance greater than six feet and spent less than 15 minutes in the room.          Demond Terry RN

## 2023-01-16 NOTE — OUTREACH NOTE
Prep Survey    Flowsheet Row Responses   [facility] patient discharged from? [facility]   Is LACE score < 7 ? Yes   Eligibility [facility]   Date of Admission 01/13/23   Date of Discharge 01/16/23   Discharge Disposition Home or Self Care   Discharge diagnosis Diverticulitis of colon    Does the patient have one of the following disease processes/diagnoses(primary or secondary)? Other   Does the patient have Home health ordered? No   Is there a DME ordered? No   Prep survey completed? Yes          TOMEKA A - Registered Nurse

## 2023-01-16 NOTE — DISCHARGE SUMMARY
"              [facility] Medicine Services  Discharge Summary    Date of Service: 23,  Patient Name: Opal Morales  : 1970  MRN: [id number]    Date of Admission: 2023  Discharge Diagnosis: Please see the problem list below  Date of Discharge:  23,  Primary Care Physician: Keturah Harp MD    Presenting Problem:   Perforated diverticulum [K57.80]  Diverticulitis of colon with perforation [K57.20]  Lower abdominal pain [R10.30]  Sepsis, due to unspecified organism, unspecified whether acute organ dysfunction present (HCC) [A41.9]  Nausea and vomiting, unspecified vomiting type [R11.2]    Active and Resolved Hospital Problems:  Active Hospital Problems    Diagnosis POA   • **Diverticulitis of colon with perforation [K57.20] Yes   • Anxiety [F41.9] Yes      Resolved Hospital Problems   No resolved problems to display.         Hospital Course     Hospital Course:  \"52 y.o. female with no significant medical history on no home medications presented to [facility] ED 2023 with complaints of lower abdominal pain. She stated at 2am this morning she had a intense pain in her left lower quadrant that felt like a hard knot and then she felt like the knot moved and then started having severe abdominal pain all the way across her lower abdomen and up into her upper abdomen. She had associated nausea but no vomiting. She had a bowel movement in the middle of the night. She is passing gas. She denied any history of diverticulitis. Her mother and maternal aunt both had diverticulitis.      In the ED CT abdomen/pelvis with contrast showed focal acute perforated sigmoid diverticulitis.  Small free air within the abdomen pelvis.  Small air-fluid pocket in the pelvis which does not appear to represent a well-formed abscess at this time.  Trace pelvic free fluid.  WBC 14.6, lactate 2.3.  Afebrile.  Heart rate 104, not hypotensive.  Patient was given IV fluids, antiemetics, analgesics, and IV " "Zosyn.  General surgery Dr. Power consulted by the ED requested patient remain n.p.o. with continued IV antibiotics.\"    Acute perforated sigmoid diverticulitis  CT a/p: focal acute perforated sigmoid diverticulitis.  Small free air within the abdomen pelvis.  Small air-fluid pocket in the pelvis which does not appear to represent a well-formed abscess at this time.  Trace pelvic free fluid.    WBC 14.6, lactate 2.3, , not hypotensive-on presentation  Continue on conservative managements with antibiotics-on Zosyn  Also on antiemetic and analgesic.  General surgeon following and recommending continuing antibiotics for now- surgery will be considered if patient clinically deteriorates  Diet advance to full use and to increase as tolerated.  If patient continues doing okay will be discharged tomorrow to follow-up with general surgeon in 1 to 2 weeks as outpatient     Patient did well throughout her hospital course.  She was cleared for discharge by general surgery with oral antibiotics.  Needs to follow-up with general surgery within 1-2 weeks of discharge.  I have also requested a GI appointment as she will likely need a colonoscopy following resolution of acute symptoms.  This was discussed with the patient and she understands and is agreeable.  Tolerating her diet.    Day of Discharge     Vital Signs:  Temp:  [98 °F (36.7 °C)-99.5 °F (37.5 °C)] 99.5 °F (37.5 °C)  Heart Rate:  [92-97] 92  Resp:  [18-26] 20  BP: (117-137)/(58-72) 128/72    Physical Exam:  Constitutional:       Appearance: Normal appearance.   HENT:      Head: Normocephalic and atraumatic.   Eyes:      Extraocular Movements: Extraocular movements intact.      Pupils: Pupils are equal, round, and reactive to light.   Cardiovascular:      Rate and Rhythm: Normal rate and regular rhythm.      Pulses: Normal pulses.      Heart sounds: Normal heart sounds.   Pulmonary:     Lungs are clear to auscultation bilaterally; no wheezes " appreciated  Abdominal:      General: Abdomen is flat. Bowel sounds are normal. There is no distension.   Musculoskeletal:      Cervical back: Normal range of motion and neck supple.      Right lower leg: No edema.      Left lower leg: No edema.   Skin:     General: Skin is warm.   Neurological:      General: No focal deficit present.      Mental Status: He is alert and oriented to person, place, and time.   Psychiatric:         Mood and Affect: Mood normal.         Behavior: Behavior normal.    Pertinent  and/or Most Recent Results     LAB RESULTS:      Lab 01/16/23  0544 01/15/23  0600 01/14/23  0549 01/13/23  1124 01/13/23  0737 01/13/23  0729   WBC 8.20 8.40 10.20  --   --  14.60*   HEMOGLOBIN 11.6* 11.0* 12.4  --   --  15.3   HEMATOCRIT 33.9* 32.5* 36.2  --   --  45.8   PLATELETS 252 208 225  --   --  364   NEUTROS ABS 7.00 7.20* 9.00*  --   --  12.90*   LYMPHS ABS 0.70 0.70 0.70  --   --  1.00   MONOS ABS 0.50 0.40 0.50  --   --  0.60   EOS ABS 0.00 0.00 0.00  --   --  0.00   MCV 89.6 89.4 89.0  --   --  89.0   LACTATE  --   --  0.8 3.2* 2.3*  --          Lab 01/15/23  0600 01/14/23  0549 01/13/23  0729   SODIUM 139 140 137   POTASSIUM 4.1 4.5 3.6   CHLORIDE 105 105 100   CO2 24.0 28.0 21.0*   ANION GAP 10.0 7.0 16.0*   BUN 11 18 13   CREATININE 0.59 0.74 0.74   EGFR 107.9 96.9 96.9   GLUCOSE 88 111* 150*   CALCIUM 8.8 8.9 9.6         Lab 01/13/23  0729   TOTAL PROTEIN 7.5   ALBUMIN 4.8   GLOBULIN 2.7   ALT (SGPT) 57*   AST (SGOT) 42*   BILIRUBIN 0.9   ALK PHOS 78   LIPASE 25                     Brief Urine Lab Results  (Last result in the past 365 days)      Color   Clarity   Blood   Leuk Est   Nitrite   Protein   CREAT   Urine HCG        01/14/23 0434 Dark Yellow   Cloudy   Negative   Trace   Positive   30 mg/dL (1+)               Microbiology Results (last 10 days)     Procedure Component Value - Date/Time    Chlamydia trachomatis, Neisseria gonorrhoeae, PCR - Swab, Urine, Random Void [[id number]]  (Normal)  Collected: 01/13/23 0908    Lab Status: Final result Specimen: Swab from Urine, Random Void Updated: 01/13/23 1052     Chlamydia DNA by PCR Not Detected     Neisseria gonorrhoeae by PCR Not Detected    Wet Prep, Genital - Swab, Vagina [[id number]]  (Normal) Collected: 01/13/23 0831    Lab Status: Final result Specimen: Swab from Vagina Updated: 01/13/23 0842     YEAST No yeast seen     HYPHAL ELEMENTS No Hyphal elements seen     WBC'S No WBC's seen     Clue Cells, Wet Prep No Clue cells seen     Trichomonas, Wet Prep No Trichomonas seen    Blood Culture - Blood, Arm, Left [[id number]]  (Normal) Collected: 01/13/23 0814    Lab Status: Preliminary result Specimen: Blood from Arm, Left Updated: 01/16/23 0830     Blood Culture No growth at 3 days    Blood Culture - Blood, Arm, Right [[id number]]  (Normal) Collected: 01/13/23 0729    Lab Status: Preliminary result Specimen: Blood from Arm, Right Updated: 01/16/23 0745     Blood Culture No growth at 3 days          CT Abdomen Pelvis With Contrast    Result Date: 1/13/2023  Impression: 1.  Focal acute perforated sigmoid diverticulitis. Small free air within the abdomen pelvis. Small air-fluid pocket in the pelvis which does not appear to represent a well-formed abscess at this time. Trace pelvic free fluid. I notified the emergency room clinician. Electronically Signed: Renetta Ruiz  1/13/2023 9:47 AM EST  Workstation ID: [id number]                  Labs Pending at Discharge:  Pending Labs     Order Current Status    Blood Culture - Blood, Arm, Left Preliminary result    Blood Culture - Blood, Arm, Right Preliminary result          Procedures Performed           Consults:   Consults     Date and Time Order Name Status Description    1/13/2023  9:56 AM Hospitalist (on-call MD unless specified)      1/13/2023  9:47 AM Surgery (on-call MD unless specified) Completed             Discharge Details        Discharge Medications      New Medications      Instructions Start Date    amoxicillin-clavulanate 875-125 MG per tablet  Commonly known as: Augmentin   1 tablet, Oral, 2 Times Daily      fluconazole 150 MG tablet  Commonly known as: Diflucan   150 mg, Oral, Once      HYDROcodone-acetaminophen 5-325 MG per tablet  Commonly known as: NORCO   1 tablet, Oral, Every 4 Hours PRN         Continue These Medications      Instructions Start Date   buPROPion  MG 24 hr tablet  Commonly known as: WELLBUTRIN XL   300 mg, Oral, Daily      cetirizine 10 MG tablet  Commonly known as: zyrTEC   10 mg, Oral, Daily      levonorgestrel 20 MCG/24HR IUD  Commonly known as: MIRENA   MIRENA (52 MG) 20 MCG/24HR IUD      SUMAtriptan 100 MG tablet  Commonly known as: Imitrex   Take one tablet at onset of headache. May repeat dose one time in 2 hours if headache not relieved.             Allergies   Allergen Reactions   • Codeine Nausea Only         Discharge Disposition:  Home or Self Care    Diet:  Hospital:  Diet Order   Procedures   • Diet: Gastrointestinal Diets; Fiber-Restricted; Texture: Regular Texture (IDDSI 7); Fluid Consistency: Thin (IDDSI 0)         Discharge Activity:         CODE STATUS:  Code Status and Medical Interventions:   Ordered at: 01/13/23 1120     Level Of Support Discussed With:    Patient     Code Status (Patient has no pulse and is not breathing):    CPR (Attempt to Resuscitate)     Medical Interventions (Patient has pulse or is breathing):    Full Support         Future Appointments   Date Time Provider Department Center   1/26/2023 11:00 AM Emerson Power MD [facility]           Time spent on Discharge including face to face service: 35 minutes    Signature: Electronically signed by Oziel Hood MD, 01/16/23, 16:15 EST.  [facility] Hospitalist Team

## 2023-01-16 NOTE — PLAN OF CARE
Goal Outcome Evaluation:                Problem: Adult Inpatient Plan of Care  Goal: Plan of Care Review  Outcome: Ongoing, Progressing  Goal: Patient-Specific Goal (Individualized)  Outcome: Ongoing, Progressing  Goal: Absence of Hospital-Acquired Illness or Injury  Outcome: Ongoing, Progressing  Intervention: Identify and Manage Fall Risk  Recent Flowsheet Documentation  Taken 1/16/2023 0200 by Babs Castorena LPN  Safety Promotion/Fall Prevention: safety round/check completed  Taken 1/16/2023 0000 by Babs Castorena LPN  Safety Promotion/Fall Prevention: safety round/check completed  Taken 1/15/2023 2200 by Babs Castroena LPN  Safety Promotion/Fall Prevention: safety round/check completed  Taken 1/15/2023 2000 by Babs Castorena LPN  Safety Promotion/Fall Prevention: safety round/check completed  Intervention: Prevent Skin Injury  Recent Flowsheet Documentation  Taken 1/15/2023 2000 by Babs Castorena LPN  Skin Protection: adhesive use limited  Intervention: Prevent and Manage VTE (Venous Thromboembolism) Risk  Recent Flowsheet Documentation  Taken 1/15/2023 2000 by Babs Castorena LPN  VTE Prevention/Management: sequential compression devices off  Range of Motion: active ROM (range of motion) encouraged  Intervention: Prevent Infection  Recent Flowsheet Documentation  Taken 1/16/2023 0200 by Babs Castorena LPN  Infection Prevention:   visitors restricted/screened   single patient room provided   rest/sleep promoted  Taken 1/16/2023 0000 by Babs Castorena LPN  Infection Prevention:   visitors restricted/screened   single patient room provided   rest/sleep promoted  Taken 1/15/2023 2200 by Babs Castorena LPN  Infection Prevention:   rest/sleep promoted   single patient room provided   visitors restricted/screened  Taken 1/15/2023 2000 by Babs Castorena LPN  Infection Prevention:   single patient room provided   rest/sleep promoted   visitors restricted/screened  Goal: Optimal Comfort and  Wellbeing  Outcome: Ongoing, Progressing  Intervention: Monitor Pain and Promote Comfort  Recent Flowsheet Documentation  Taken 1/16/2023 0400 by Babs Castorena LPN  Pain Management Interventions:   quiet environment facilitated   see MAR  Taken 1/16/2023 0000 by Babs Castorena LPN  Pain Management Interventions:   quiet environment facilitated   see MAR  Taken 1/15/2023 2000 by Babs Castorena LPN  Pain Management Interventions:   quiet environment facilitated   see MAR  Intervention: Provide Person-Centered Care  Recent Flowsheet Documentation  Taken 1/15/2023 2000 by Babs Castorena LPN  Trust Relationship/Rapport: care explained  Goal: Readiness for Transition of Care  Outcome: Ongoing, Progressing     Problem: Pain Acute  Goal: Acceptable Pain Control and Functional Ability  Outcome: Ongoing, Progressing  Intervention: Prevent or Manage Pain  Recent Flowsheet Documentation  Taken 1/16/2023 0200 by Babs Castorena LPN  Medication Review/Management: medications reviewed  Taken 1/16/2023 0000 by Babs Castorena LPN  Medication Review/Management: medications reviewed  Taken 1/15/2023 2200 by Babs Castorena LPN  Medication Review/Management: medications reviewed  Taken 1/15/2023 2000 by Babs Castorena LPN  Medication Review/Management: medications reviewed  Intervention: Develop Pain Management Plan  Recent Flowsheet Documentation  Taken 1/16/2023 0400 by Babs Castorena LPN  Pain Management Interventions:   quiet environment facilitated   see MAR  Taken 1/16/2023 0000 by Babs Castorena LPN  Pain Management Interventions:   quiet environment facilitated   see MAR  Taken 1/15/2023 2000 by Babs Castorena LPN  Pain Management Interventions:   quiet environment facilitated   see MAR  Intervention: Optimize Psychosocial Wellbeing  Recent Flowsheet Documentation  Taken 1/15/2023 2000 by Babs Castorena LPN  Diversional Activities: television   No issues noted

## 2023-01-16 NOTE — PROGRESS NOTES
GENERAL SURGERY PROGRESS NOTE  Chief Complaint:  Surgery Follow up   LOS: 3 days       Subjective     Interval History:     Feels well. Having BMs. Tolerating diet.    Objective     Vital Signs  Temp:  [98 °F (36.7 °C)-99.3 °F (37.4 °C)] 99 °F (37.2 °C)  Heart Rate:  [93-97] 94  Resp:  [18-26] 18  BP: (117-137)/(58-72) 134/72    Physical Exam:   Abdomen soft  Labs:  Lab Results (last 24 hours)     Procedure Component Value Units Date/Time    Blood Culture - Blood, Arm, Left [[id number]]  (Normal) Collected: 01/13/23 0814    Specimen: Blood from Arm, Left Updated: 01/16/23 0830     Blood Culture No growth at 3 days    Blood Culture - Blood, Arm, Right [[id number]]  (Normal) Collected: 01/13/23 0729    Specimen: Blood from Arm, Right Updated: 01/16/23 0745     Blood Culture No growth at 3 days    CBC & Differential [[id number]]  (Abnormal) Collected: 01/16/23 0544    Specimen: Blood Updated: 01/16/23 0715    Narrative:      The following orders were created for panel order CBC & Differential.  Procedure                               Abnormality         Status                     ---------                               -----------         ------                     CBC Auto Differential[[id number]]        Abnormal            Final result                 Please view results for these tests on the individual orders.    CBC Auto Differential [[id number]]  (Abnormal) Collected: 01/16/23 0544    Specimen: Blood Updated: 01/16/23 0715     WBC 8.20 10*3/mm3      RBC 3.78 10*6/mm3      Hemoglobin 11.6 g/dL      Hematocrit 33.9 %      MCV 89.6 fL      MCH 30.6 pg      MCHC 34.1 g/dL      RDW 12.3 %      RDW-SD 40.7 fl      MPV 7.5 fL      Platelets 252 10*3/mm3      Neutrophil % 85.1 %      Lymphocyte % 8.9 %      Monocyte % 5.5 %      Eosinophil % 0.3 %      Basophil % 0.2 %      Neutrophils, Absolute 7.00 10*3/mm3      Lymphocytes, Absolute 0.70 10*3/mm3      Monocytes, Absolute 0.50 10*3/mm3      Eosinophils, Absolute 0.00  10*3/mm3      Basophils, Absolute 0.00 10*3/mm3      nRBC 0.0 /100 WBC            Results Review:     Labs and imaging for today were reviewed.    Assessment & Plan     Opal Morales is a 52 y.o. female who has resolving diverticulitis.    Ok for DC home on oral Abx. See me in one week.          This document has been electronically signed by Emerson Power MD on January 16, 2023 12:51 EST        Emerson Power MD  01/16/23  12:51 EST

## 2023-01-17 ENCOUNTER — TRANSITIONAL CARE MANAGEMENT TELEPHONE ENCOUNTER (OUTPATIENT)
Dept: CALL CENTER | Facility: HOSPITAL | Age: 53
End: 2023-01-17
Payer: COMMERCIAL

## 2023-01-17 NOTE — PROGRESS NOTES
Enter Query Response Below      Query Response:     Unable to determine         If applicable, please update the problem list.      Patient: Opal Sanchez        : 1970  Account: [id number]           Admit Date:         How to Respond to this query:       a. Click New Note     b. Answer query within the yellow box.                c. Update the Problem List, if applicable.      If you have any questions about this query contact me at: [phone]    Dr. Hood:     52 year old female admitted on 23 with diverticulitis with perforation.  The ED Provider note and discharge summary presenting problem includes the diagnosis of sepsis.  The discharge summary hospital course does not include the diagnosis of sepsis.  The lab results on 23 includes a WBC count of 14.60, Glucose 150, and Lactate 3.2.  The vital signs on 23 0713 includes a heart rate of 104.  She has been treated with IV Zosyn, lactated ringers IV fluid bolus, and IV infusions.      After study, can the patient's condition be further clarified as:     Sepsis present on admission  Sepsis ruled out  Other-specify___________________________________  Unable to determine     By submitting this query, we are merely seeking further clarification of documentation to accurately reflect all conditions that you are monitoring, evaluating, treating or that extend the hospitalization or utilize additional resources of care. Please utilize your independent clinical judgment when addressing the question(s) above.     This query and your response, once completed, will be entered into the legal medical record.    Sincerely,  Urszula Ruano RN,BSN    [email or web address]  Clinical Documentation Integrity Program

## 2023-01-17 NOTE — OUTREACH NOTE
Call Center TCM Note    Flowsheet Row Responses   [facility] facility patient discharged from? [facility]   Does the patient have one of the following disease processes/diagnoses(primary or secondary)? Other   TCM attempt successful? No   Unsuccessful attempts Attempt 1          Clotilde Land MA    1/17/2023, 14:46 EST

## 2023-01-17 NOTE — OUTREACH NOTE
Call Center TCM Note    Flowsheet Row Responses   [facility] patient discharged from? [facility]   Does the patient have one of the following disease processes/diagnoses(primary or secondary)? Other   TCM attempt successful? Yes   Discharge diagnosis Diverticulitis of colon    Meds reviewed with patient/caregiver? Yes   Is the patient having any side effects they believe may be caused by any medication additions or changes? No   Does the patient have all medications ordered at discharge? Yes   Is the patient taking all medications as directed (includes completed medication regime)? Yes   Does the patient have an appointment with their PCP within 7 days of discharge? No appointments available   Has home health visited the patient within 72 hours of discharge? N/A   Psychosocial issues? No   Did the patient receive a copy of their discharge instructions? Yes   Nursing interventions Reviewed instructions with patient   What is the patient's perception of their health status since discharge? Improving   Is the patient/caregiver able to teach back signs and symptoms related to disease process for when to call PCP? Yes   Is the patient/caregiver able to teach back signs and symptoms related to disease process for when to call 911? Yes   Is the patient/caregiver able to teach back the hierarchy of who to call/visit for symptoms/problems? PCP, Specialist, Home health nurse, Urgent Care, ED, 911 Yes   If the patient is a current smoker, are they able to teach back resources for cessation? Not a smoker   TCM call completed? Yes   Wrap up additional comments D/C DX: Diverticulitis of colon - pt feeling better, new rx's Augmentin, Fluconazole, Norco in place. Pt does have surgical consult 01/26/2023. PCP Dr Keturah Harp has no appts I could access for TCM APPT.           Clotilde Land MA    1/17/2023, 16:18 EST

## 2023-01-17 NOTE — CASE MANAGEMENT/SOCIAL WORK
Case Management Discharge Note      Final Note: Home    Provided Post Acute Provider List?: N/A  N/A Provider List Comment: Anticipate home    Selected Continued Care - Discharged on 1/16/2023 Admission date: 1/13/2023 - Discharge disposition: Home or Self Care            Transportation Services  Private: Car    Final Discharge Disposition Code: 01 - home or self-care

## 2023-01-18 LAB
BACTERIA SPEC AEROBE CULT: NORMAL
BACTERIA SPEC AEROBE CULT: NORMAL

## 2023-01-26 ENCOUNTER — OFFICE VISIT (OUTPATIENT)
Dept: SURGERY | Facility: CLINIC | Age: 53
End: 2023-01-26
Payer: COMMERCIAL

## 2023-01-26 VITALS
HEIGHT: 65 IN | DIASTOLIC BLOOD PRESSURE: 81 MMHG | TEMPERATURE: 97.8 F | BODY MASS INDEX: 28.16 KG/M2 | OXYGEN SATURATION: 99 % | WEIGHT: 169 LBS | SYSTOLIC BLOOD PRESSURE: 159 MMHG | HEART RATE: 83 BPM

## 2023-01-26 DIAGNOSIS — Z09 ENCOUNTER FOR FOLLOW-UP: Primary | ICD-10-CM

## 2023-01-26 PROCEDURE — 99213 OFFICE O/P EST LOW 20 MIN: CPT | Performed by: SURGERY

## 2023-01-26 RX ORDER — FLUCONAZOLE 150 MG/1
150 TABLET ORAL ONCE
COMMUNITY
Start: 2023-01-16 | End: 2023-04-04

## 2023-01-26 NOTE — PROGRESS NOTES
CHIEF COMPLAINT:    Chief Complaint   Patient presents with   • [facility]        HISTORY OF PRESENT ILLNESS:    Opal Morales is a 52 y.o. female who is here for follow-up after recent hospitalization for microperforated diverticulitis.  Patient is finishing her oral antibiotics currently.  Overall she feels back to her baseline.  She reports no abdominal pain.  She has been having normal bowel function and eating without issue.  She reports no pain.    EXAM:  Vitals:    01/26/23 1101   BP: 159/81   Pulse: 83   Temp: 97.8 °F (36.6 °C)   SpO2: 99%         Abdomen soft, nontender    ASSESSMENT:    Status post microperforated diverticulitis I discussed with her the importance of colonoscopy in the future.    PLAN:    She has some interest in following up with Dr. Jacobson as her parents see him.  If not she can call us to schedule follow-up for colonoscopy.          This document has been electronically signed by Emerson Power MD on January 26, 2023 11:53 EST

## 2023-02-15 ENCOUNTER — OFFICE (OUTPATIENT)
Dept: URBAN - METROPOLITAN AREA CLINIC 64 | Facility: CLINIC | Age: 53
End: 2023-02-15

## 2023-02-15 VITALS
WEIGHT: 180 LBS | DIASTOLIC BLOOD PRESSURE: 103 MMHG | SYSTOLIC BLOOD PRESSURE: 149 MMHG | HEIGHT: 65 IN | HEART RATE: 77 BPM

## 2023-02-15 DIAGNOSIS — Z80.0 FAMILY HISTORY OF MALIGNANT NEOPLASM OF DIGESTIVE ORGANS: ICD-10-CM

## 2023-02-15 DIAGNOSIS — D64.9 ANEMIA, UNSPECIFIED: ICD-10-CM

## 2023-02-15 DIAGNOSIS — R10.32 LEFT LOWER QUADRANT PAIN: ICD-10-CM

## 2023-02-15 DIAGNOSIS — K57.20 DIVERTICULITIS OF LARGE INTESTINE WITH PERFORATION AND ABSCE: ICD-10-CM

## 2023-02-15 DIAGNOSIS — Z12.11 ENCOUNTER FOR SCREENING FOR MALIGNANT NEOPLASM OF COLON: ICD-10-CM

## 2023-02-15 PROCEDURE — 99204 OFFICE O/P NEW MOD 45 MIN: CPT

## 2023-02-15 RX ORDER — DICYCLOMINE HYDROCHLORIDE 20 MG/1
80 TABLET ORAL
Qty: 120 | Refills: 12 | Status: ACTIVE
Start: 2023-02-15

## 2023-03-05 RX ORDER — BUPROPION HYDROCHLORIDE 300 MG/1
300 TABLET ORAL DAILY
Qty: 90 TABLET | Refills: 0 | Status: SHIPPED | OUTPATIENT
Start: 2023-03-05

## 2023-04-04 ENCOUNTER — OFFICE VISIT (OUTPATIENT)
Dept: FAMILY MEDICINE CLINIC | Facility: CLINIC | Age: 53
End: 2023-04-04
Payer: COMMERCIAL

## 2023-04-04 VITALS
BODY MASS INDEX: 28.16 KG/M2 | HEIGHT: 65 IN | RESPIRATION RATE: 16 BRPM | DIASTOLIC BLOOD PRESSURE: 83 MMHG | HEART RATE: 76 BPM | SYSTOLIC BLOOD PRESSURE: 120 MMHG | WEIGHT: 169 LBS | OXYGEN SATURATION: 96 %

## 2023-04-04 DIAGNOSIS — M79.642 LEFT HAND PAIN: ICD-10-CM

## 2023-04-04 DIAGNOSIS — F41.9 ANXIETY: Primary | ICD-10-CM

## 2023-04-04 PROCEDURE — 99213 OFFICE O/P EST LOW 20 MIN: CPT | Performed by: FAMILY MEDICINE

## 2023-04-04 RX ORDER — DICYCLOMINE HCL 20 MG
TABLET ORAL
COMMUNITY
Start: 2023-02-15

## 2023-04-04 RX ORDER — FLUTICASONE PROPIONATE 50 MCG
SPRAY, SUSPENSION (ML) NASAL
COMMUNITY
Start: 2023-03-08

## 2023-04-04 NOTE — PROGRESS NOTES
Subjective   Opal Morales is a 52 y.o. female.     History of Present Illness  Here for follow up on anxiety       Ms. Morales is a 52-year-old female who presents for a follow-up on her anxiety.    The patient is doing well on Wellbutrin.    She recently had a perforated diverticulum and was hospitalized, but no surgery had to be done. She is currently scheduled for a colonoscopy on 04/19/2023 with a CT scheduled just prior to that. She got a new Kinyarwanda Milner along with the one that she already had. They have knocked her down a couple of times, and have left hand pain since then along with some swelling.    The following portions of the patient's history were reviewed and updated as appropriate: allergies, current medications, past family history, past medical history, past social history, past surgical history, and problem list.  Past Medical History:   Diagnosis Date   • Allergic    • Anxiety    • Diverticulitis of colon 1/13/2023   • Diverticulosis 1/13/34   • SVT (supraventricular tachycardia)      History reviewed. No pertinent surgical history.  Family History   Problem Relation Age of Onset   • No Known Problems Mother    • Diabetes Father    • Hypertension Father    • Kidney disease Father    • Cancer Father    • Hypertension Other    • Liver disease Other      Social History     Socioeconomic History   • Marital status:    Tobacco Use   • Smoking status: Never   • Smokeless tobacco: Never   Vaping Use   • Vaping Use: Never used   Substance and Sexual Activity   • Alcohol use: Yes     Alcohol/week: 4.0 standard drinks     Types: 1 Glasses of wine, 3 Cans of beer per week   • Drug use: Yes     Types: Marijuana   • Sexual activity: Yes     Partners: Male     Birth control/protection: I.U.D.         Current Outpatient Medications:   •  buPROPion XL (WELLBUTRIN XL) 300 MG 24 hr tablet, TAKE 1 TABLET BY MOUTH DAILY, Disp: 90 tablet, Rfl: 0  •  cetirizine (zyrTEC) 10 MG tablet, TAKE 1 TABLET BY MOUTH  "DAILY, Disp: 90 tablet, Rfl: 2  •  dicyclomine (BENTYL) 20 MG tablet, TAKE 1 TABLET BY MOUTH EVERY 6 HOURS AS NEEDED FOR CRAMPS, Disp: , Rfl:   •  fluticasone (FLONASE) 50 MCG/ACT nasal spray, USE 2 SPRAYS IN EACH NOSTRIL DAILY AS DIRECTED BY PROVIDER, Disp: , Rfl:   •  levonorgestrel (MIRENA) 20 MCG/24HR IUD, MIRENA (52 MG) 20 MCG/24HR IUD, Disp: , Rfl:   •  SUMAtriptan (Imitrex) 100 MG tablet, Take one tablet at onset of headache. May repeat dose one time in 2 hours if headache not relieved., Disp: 9 tablet, Rfl: 11    Review of Systems  /83 (BP Location: Left arm, Patient Position: Sitting, Cuff Size: Adult)   Pulse 76   Resp 16   Ht 165.1 cm (65\")   Wt 76.7 kg (169 lb)   SpO2 96%   BMI 28.12 kg/m²   A review of systems was performed, and the pertinent positives are noted in the HPI.      Objective   Physical Exam  Vitals and nursing note reviewed.   Constitutional:       Appearance: Normal appearance. She is overweight.   Cardiovascular:      Rate and Rhythm: Normal rate and regular rhythm.      Heart sounds: Normal heart sounds.   Pulmonary:      Effort: Pulmonary effort is normal.      Breath sounds: Normal breath sounds.   Musculoskeletal:      Cervical back: Neck supple.      Comments: Mild tenderness and swelling over the thenar eminence on the left   Neurological:      Mental Status: She is alert.   Psychiatric:         Mood and Affect: Mood is anxious.         Behavior: Behavior is cooperative.           Assessment & Plan   Problems Addressed this Visit        Mental Health    Anxiety - Primary   Other Visit Diagnoses     Left hand pain        Relevant Orders    XR Hand 3+ View Left      Diagnoses       Codes Comments    Anxiety    -  Primary ICD-10-CM: F41.9  ICD-9-CM: 300.00     Left hand pain     ICD-10-CM: M79.642  ICD-9-CM: 729.5           1. Anxiety  - She will continue her Wellbutrin.    2. Left hand pain  - I will send her for an x-ray.        Transcribed from ambient dictation for " Keturah Harp MD by Maris Alva.  04/04/23   10:04 EDT    Patient or patient representative verbalized consent to the visit recording.  I have personally performed the services described in this document as transcribed by the above individual, and it is both accurate and complete.

## 2023-04-05 RX ORDER — CETIRIZINE HYDROCHLORIDE 10 MG/1
TABLET ORAL
Qty: 90 TABLET | Refills: 2 | Status: SHIPPED | OUTPATIENT
Start: 2023-04-05

## 2023-04-11 DIAGNOSIS — M79.642 LEFT HAND PAIN: Primary | ICD-10-CM

## 2023-04-19 ENCOUNTER — OFFICE (OUTPATIENT)
Dept: URBAN - METROPOLITAN AREA PATHOLOGY 4 | Facility: PATHOLOGY | Age: 53
End: 2023-04-19
Payer: COMMERCIAL

## 2023-04-19 ENCOUNTER — ON CAMPUS - OUTPATIENT (OUTPATIENT)
Dept: URBAN - METROPOLITAN AREA HOSPITAL 2 | Facility: HOSPITAL | Age: 53
End: 2023-04-19
Payer: COMMERCIAL

## 2023-04-19 VITALS
SYSTOLIC BLOOD PRESSURE: 158 MMHG | TEMPERATURE: 97.7 F | DIASTOLIC BLOOD PRESSURE: 100 MMHG | HEART RATE: 94 BPM | DIASTOLIC BLOOD PRESSURE: 107 MMHG | RESPIRATION RATE: 16 BRPM | SYSTOLIC BLOOD PRESSURE: 127 MMHG | DIASTOLIC BLOOD PRESSURE: 70 MMHG | DIASTOLIC BLOOD PRESSURE: 82 MMHG | OXYGEN SATURATION: 97 % | HEART RATE: 95 BPM | SYSTOLIC BLOOD PRESSURE: 142 MMHG | OXYGEN SATURATION: 100 % | SYSTOLIC BLOOD PRESSURE: 145 MMHG | DIASTOLIC BLOOD PRESSURE: 101 MMHG | DIASTOLIC BLOOD PRESSURE: 71 MMHG | DIASTOLIC BLOOD PRESSURE: 98 MMHG | OXYGEN SATURATION: 99 % | RESPIRATION RATE: 18 BRPM | OXYGEN SATURATION: 96 % | OXYGEN SATURATION: 98 % | HEART RATE: 91 BPM | SYSTOLIC BLOOD PRESSURE: 129 MMHG | WEIGHT: 166 LBS | HEART RATE: 109 BPM | SYSTOLIC BLOOD PRESSURE: 140 MMHG | HEART RATE: 98 BPM | DIASTOLIC BLOOD PRESSURE: 91 MMHG | HEIGHT: 65 IN | HEART RATE: 96 BPM | SYSTOLIC BLOOD PRESSURE: 155 MMHG | HEART RATE: 89 BPM | RESPIRATION RATE: 14 BRPM

## 2023-04-19 DIAGNOSIS — D12.2 BENIGN NEOPLASM OF ASCENDING COLON: ICD-10-CM

## 2023-04-19 DIAGNOSIS — Z12.11 ENCOUNTER FOR SCREENING FOR MALIGNANT NEOPLASM OF COLON: ICD-10-CM

## 2023-04-19 DIAGNOSIS — D12.8 BENIGN NEOPLASM OF RECTUM: ICD-10-CM

## 2023-04-19 DIAGNOSIS — K57.30 DIVERTICULOSIS OF LARGE INTESTINE WITHOUT PERFORATION OR ABS: ICD-10-CM

## 2023-04-19 PROBLEM — K63.5 POLYP OF COLON: Status: ACTIVE | Noted: 2023-04-19

## 2023-04-19 PROBLEM — K62.1 RECTAL POLYP: Status: ACTIVE | Noted: 2023-04-19

## 2023-04-19 LAB
GI HISTOLOGY: A. UNSPECIFIED: (no result)
GI HISTOLOGY: B. UNSPECIFIED: (no result)
GI HISTOLOGY: PDF REPORT: (no result)

## 2023-04-19 PROCEDURE — 88305 TISSUE EXAM BY PATHOLOGIST: CPT | Performed by: INTERNAL MEDICINE

## 2023-04-19 PROCEDURE — 45385 COLONOSCOPY W/LESION REMOVAL: CPT | Mod: 33 | Performed by: INTERNAL MEDICINE

## 2023-06-02 RX ORDER — BUPROPION HYDROCHLORIDE 300 MG/1
300 TABLET ORAL DAILY
Qty: 90 TABLET | Refills: 1 | Status: SHIPPED | OUTPATIENT
Start: 2023-06-02

## 2023-06-02 RX ORDER — OMEPRAZOLE 20 MG/1
20 CAPSULE, DELAYED RELEASE ORAL DAILY
Qty: 90 CAPSULE | Refills: 3 | OUTPATIENT
Start: 2023-06-02

## 2023-06-02 RX ORDER — FLUTICASONE PROPIONATE 50 MCG
SPRAY, SUSPENSION (ML) NASAL
Qty: 48 G | Refills: 1 | Status: SHIPPED | OUTPATIENT
Start: 2023-06-02

## 2023-07-17 ENCOUNTER — TELEPHONE (OUTPATIENT)
Dept: FAMILY MEDICINE CLINIC | Facility: CLINIC | Age: 53
End: 2023-07-17

## 2023-07-17 NOTE — TELEPHONE ENCOUNTER
Caller: Opal Morales    Relationship to patient: Self    Best call back number: [phone]    Chief complaint: REMOVAL OF SKIN TAGS ON NECK AND ARMPIT    Type of visit: IN OFFICE PROCEDURE    Requested date: SOONEST    Additional notes:

## 2023-07-25 ENCOUNTER — TELEPHONE (OUTPATIENT)
Dept: FAMILY MEDICINE CLINIC | Facility: CLINIC | Age: 53
End: 2023-07-25

## 2023-07-25 NOTE — TELEPHONE ENCOUNTER
Hub staff attempted to follow warm transfer process and was unsuccessful     Caller: Molly Morales    Relationship to patient: Self    Best call back number: [phone]     Patient is needing: MOLLY REQUESTS A CALLBACK FROM THE  AND DID NOT GIVE ANY FURTHER DETAILS.    PLEASE ADVISE.

## 2023-08-16 ENCOUNTER — LAB (OUTPATIENT)
Dept: LAB | Facility: HOSPITAL | Age: 53
End: 2023-08-16
Payer: COMMERCIAL

## 2023-08-16 ENCOUNTER — HOSPITAL ENCOUNTER (OUTPATIENT)
Dept: CARDIOLOGY | Facility: HOSPITAL | Age: 53
Discharge: HOME | End: 2023-08-16
Payer: COMMERCIAL

## 2023-08-16 ENCOUNTER — TRANSCRIBE ORDERS (OUTPATIENT)
Dept: LAB | Facility: HOSPITAL | Age: 53
End: 2023-08-16
Payer: COMMERCIAL

## 2023-08-16 ENCOUNTER — HOSPITAL ENCOUNTER (OUTPATIENT)
Dept: GENERAL RADIOLOGY | Facility: HOSPITAL | Age: 53
Discharge: HOME | End: 2023-08-16
Payer: COMMERCIAL

## 2023-08-16 DIAGNOSIS — Z01.818 PRE-OP TESTING: ICD-10-CM

## 2023-08-16 DIAGNOSIS — Z01.818 PRE-OP TESTING: Primary | ICD-10-CM

## 2023-08-16 LAB
ABO GROUP BLD: NORMAL
ANION GAP SERPL CALCULATED.3IONS-SCNC: 9.5 MMOL/L (ref 5–15)
BASOPHILS # BLD AUTO: 0.01 10*3/MM3 (ref 0–0.2)
BASOPHILS NFR BLD AUTO: 0.2 % (ref 0–1.5)
BLD GP AB SCN SERPL QL: NEGATIVE
BUN SERPL-MCNC: 16 MG/DL (ref 6–20)
BUN/CREAT SERPL: 21.1 (ref 7–25)
CALCIUM SPEC-SCNC: 9.2 MG/DL (ref 8.6–10.5)
CHLORIDE SERPL-SCNC: 105 MMOL/L (ref 98–107)
CO2 SERPL-SCNC: 26.5 MMOL/L (ref 22–29)
CREAT SERPL-MCNC: 0.76 MG/DL (ref 0.57–1)
DEPRECATED RDW RBC AUTO: 38.4 FL (ref 37–54)
EGFRCR SERPLBLD CKD-EPI 2021: 93.8 ML/MIN/1.73
EOSINOPHIL # BLD AUTO: 0.08 10*3/MM3 (ref 0–0.4)
EOSINOPHIL NFR BLD AUTO: 1.9 % (ref 0.3–6.2)
ERYTHROCYTE [DISTWIDTH] IN BLOOD BY AUTOMATED COUNT: 11.8 % (ref 12.3–15.4)
GLUCOSE SERPL-MCNC: 78 MG/DL (ref 65–99)
HCT VFR BLD AUTO: 40.8 % (ref 34–46.6)
HGB BLD-MCNC: 13.8 G/DL (ref 12–15.9)
IMM GRANULOCYTES # BLD AUTO: 0.01 10*3/MM3 (ref 0–0.05)
IMM GRANULOCYTES NFR BLD AUTO: 0.2 % (ref 0–0.5)
LYMPHOCYTES # BLD AUTO: 1.11 10*3/MM3 (ref 0.7–3.1)
LYMPHOCYTES NFR BLD AUTO: 26.4 % (ref 19.6–45.3)
MCH RBC QN AUTO: 30.5 PG (ref 26.6–33)
MCHC RBC AUTO-ENTMCNC: 33.8 G/DL (ref 31.5–35.7)
MCV RBC AUTO: 90.3 FL (ref 79–97)
MONOCYTES # BLD AUTO: 0.27 10*3/MM3 (ref 0.1–0.9)
MONOCYTES NFR BLD AUTO: 6.4 % (ref 5–12)
NEUTROPHILS NFR BLD AUTO: 2.73 10*3/MM3 (ref 1.7–7)
NEUTROPHILS NFR BLD AUTO: 64.9 % (ref 42.7–76)
NRBC BLD AUTO-RTO: 0 /100 WBC (ref 0–0.2)
PLATELET # BLD AUTO: 296 10*3/MM3 (ref 140–450)
PMV BLD AUTO: 8.9 FL (ref 6–12)
POTASSIUM SERPL-SCNC: 4.1 MMOL/L (ref 3.5–5.2)
QT INTERVAL: 392 MS
RBC # BLD AUTO: 4.52 10*6/MM3 (ref 3.77–5.28)
RH BLD: POSITIVE
SODIUM SERPL-SCNC: 141 MMOL/L (ref 136–145)
T&S EXPIRATION DATE: NORMAL
WBC NRBC COR # BLD: 4.21 10*3/MM3 (ref 3.4–10.8)

## 2023-08-16 PROCEDURE — 85025 COMPLETE CBC W/AUTO DIFF WBC: CPT

## 2023-08-16 PROCEDURE — 86900 BLOOD TYPING SEROLOGIC ABO: CPT

## 2023-08-16 PROCEDURE — 86850 RBC ANTIBODY SCREEN: CPT

## 2023-08-16 PROCEDURE — 36415 COLL VENOUS BLD VENIPUNCTURE: CPT

## 2023-08-16 PROCEDURE — 71046 X-RAY EXAM CHEST 2 VIEWS: CPT

## 2023-08-16 PROCEDURE — 93005 ELECTROCARDIOGRAM TRACING: CPT | Performed by: OBSTETRICS & GYNECOLOGY

## 2023-08-16 PROCEDURE — 86901 BLOOD TYPING SEROLOGIC RH(D): CPT

## 2023-08-16 PROCEDURE — 80048 BASIC METABOLIC PNL TOTAL CA: CPT

## 2023-08-24 LAB — QT INTERVAL: 392 MS

## 2023-08-28 PROCEDURE — 88307 TISSUE EXAM BY PATHOLOGIST: CPT | Performed by: OBSTETRICS & GYNECOLOGY

## 2023-08-29 ENCOUNTER — LAB REQUISITION (OUTPATIENT)
Dept: LAB | Facility: HOSPITAL | Age: 53
End: 2023-08-29
Payer: COMMERCIAL

## 2023-08-29 DIAGNOSIS — N81.12 CYSTOCELE, LATERAL: ICD-10-CM

## 2023-08-29 DIAGNOSIS — N81.6 RECTOCELE: ICD-10-CM

## 2023-08-29 DIAGNOSIS — N81.4 UTEROVAGINAL PROLAPSE, UNSPECIFIED: ICD-10-CM

## 2023-08-30 LAB
LAB AP CASE REPORT: NORMAL
PATH REPORT.FINAL DX SPEC: NORMAL
PATH REPORT.GROSS SPEC: NORMAL

## 2023-11-29 RX ORDER — FLUTICASONE PROPIONATE 50 MCG
SPRAY, SUSPENSION (ML) NASAL
Qty: 48 G | Refills: 1 | Status: SHIPPED | OUTPATIENT
Start: 2023-11-29

## 2023-11-29 RX ORDER — BUPROPION HYDROCHLORIDE 300 MG/1
300 TABLET ORAL DAILY
Qty: 90 TABLET | Refills: 1 | Status: SHIPPED | OUTPATIENT
Start: 2023-11-29

## 2024-03-13 RX ORDER — CETIRIZINE HYDROCHLORIDE 10 MG/1
TABLET ORAL
Qty: 90 TABLET | Refills: 0 | Status: SHIPPED | OUTPATIENT
Start: 2024-03-13

## 2024-04-05 ENCOUNTER — OFFICE VISIT (OUTPATIENT)
Dept: FAMILY MEDICINE CLINIC | Facility: CLINIC | Age: 54
End: 2024-04-05
Payer: COMMERCIAL

## 2024-04-05 ENCOUNTER — LAB (OUTPATIENT)
Dept: FAMILY MEDICINE CLINIC | Facility: CLINIC | Age: 54
End: 2024-04-05
Payer: COMMERCIAL

## 2024-04-05 VITALS
SYSTOLIC BLOOD PRESSURE: 127 MMHG | BODY MASS INDEX: 28.96 KG/M2 | HEART RATE: 74 BPM | HEIGHT: 65 IN | TEMPERATURE: 98 F | OXYGEN SATURATION: 97 % | DIASTOLIC BLOOD PRESSURE: 85 MMHG | WEIGHT: 173.8 LBS

## 2024-04-05 DIAGNOSIS — Z00.00 ENCOUNTER FOR GENERAL ADULT MEDICAL EXAMINATION WITHOUT ABNORMAL FINDINGS: ICD-10-CM

## 2024-04-05 DIAGNOSIS — Z11.59 NEED FOR HEPATITIS C SCREENING TEST: ICD-10-CM

## 2024-04-05 DIAGNOSIS — Z23 NEED FOR TDAP VACCINATION: ICD-10-CM

## 2024-04-05 DIAGNOSIS — Z00.00 ENCOUNTER FOR GENERAL ADULT MEDICAL EXAMINATION WITHOUT ABNORMAL FINDINGS: Primary | ICD-10-CM

## 2024-04-05 LAB
ALBUMIN SERPL-MCNC: 4.7 G/DL (ref 3.5–5.2)
ALBUMIN/GLOB SERPL: 2.1 G/DL
ALP SERPL-CCNC: 63 U/L (ref 39–117)
ALT SERPL W P-5'-P-CCNC: 29 U/L (ref 1–33)
ANION GAP SERPL CALCULATED.3IONS-SCNC: 10.7 MMOL/L (ref 5–15)
AST SERPL-CCNC: 20 U/L (ref 1–32)
BILIRUB SERPL-MCNC: 0.5 MG/DL (ref 0–1.2)
BUN SERPL-MCNC: 14 MG/DL (ref 6–20)
BUN/CREAT SERPL: 18.4 (ref 7–25)
CALCIUM SPEC-SCNC: 9.3 MG/DL (ref 8.6–10.5)
CHLORIDE SERPL-SCNC: 103 MMOL/L (ref 98–107)
CHOLEST SERPL-MCNC: 187 MG/DL (ref 0–200)
CO2 SERPL-SCNC: 27.3 MMOL/L (ref 22–29)
CREAT SERPL-MCNC: 0.76 MG/DL (ref 0.57–1)
EGFRCR SERPLBLD CKD-EPI 2021: 93.3 ML/MIN/1.73
GLOBULIN UR ELPH-MCNC: 2.2 GM/DL
GLUCOSE SERPL-MCNC: 82 MG/DL (ref 65–99)
HBA1C MFR BLD: 5.1 % (ref 4.8–5.6)
HCV AB SER DONR QL: NORMAL
HDLC SERPL-MCNC: 78 MG/DL (ref 40–60)
LDLC SERPL CALC-MCNC: 97 MG/DL (ref 0–100)
LDLC/HDLC SERPL: 1.23 {RATIO}
POTASSIUM SERPL-SCNC: 4.3 MMOL/L (ref 3.5–5.2)
PROT SERPL-MCNC: 6.9 G/DL (ref 6–8.5)
SODIUM SERPL-SCNC: 141 MMOL/L (ref 136–145)
TRIGL SERPL-MCNC: 65 MG/DL (ref 0–150)
VLDLC SERPL-MCNC: 12 MG/DL (ref 5–40)

## 2024-04-05 PROCEDURE — 80061 LIPID PANEL: CPT | Performed by: FAMILY MEDICINE

## 2024-04-05 PROCEDURE — 80053 COMPREHEN METABOLIC PANEL: CPT | Performed by: FAMILY MEDICINE

## 2024-04-05 PROCEDURE — 86803 HEPATITIS C AB TEST: CPT | Performed by: FAMILY MEDICINE

## 2024-04-05 PROCEDURE — 83036 HEMOGLOBIN GLYCOSYLATED A1C: CPT | Performed by: FAMILY MEDICINE

## 2024-04-05 PROCEDURE — 36415 COLL VENOUS BLD VENIPUNCTURE: CPT

## 2024-04-05 RX ORDER — FOLIC ACID 1 MG/1
1 TABLET ORAL DAILY
COMMUNITY

## 2024-04-05 RX ORDER — ESTRADIOL 0.1 MG/G
CREAM VAGINAL
COMMUNITY
Start: 2024-01-14

## 2024-04-05 RX ORDER — LYSINE HCL 500 MG
1000 TABLET ORAL DAILY
COMMUNITY

## 2024-04-05 NOTE — PROGRESS NOTES
Subjective   Opal Trammell is a 53 y.o. female.     History of Present Illness  Here for roni TRAMMELL her YOB: 1970, she is a 53-year-old female who comes in for her physical. She sees GYN.    The patient is currently under the care of a physician for plantar fasciitis.    The patient acknowledges inadequate hydration due to her busy schedule, however, she is making an effort to increase her water intake.    She denies any character headaches, chest pain, or shortness of breath. She denies any abdominal pain. Her urination is normal. Emotionally, she is doing okay.    The following portions of the patient's history were reviewed and updated as appropriate: allergies, current medications, past family history, past medical history, past social history, past surgical history, and problem list.  Past Medical History:   Diagnosis Date    Allergic     Anxiety     Diverticulitis of colon 1/13/2023    Diverticulosis 1/13/34    SVT (supraventricular tachycardia)      History reviewed. No pertinent surgical history.  Family History   Problem Relation Age of Onset    No Known Problems Mother     Diabetes Father     Hypertension Father     Kidney disease Father     Cancer Father     Hypertension Other     Liver disease Other      Social History     Socioeconomic History    Marital status:    Tobacco Use    Smoking status: Never     Passive exposure: Never    Smokeless tobacco: Never   Vaping Use    Vaping status: Never Used   Substance and Sexual Activity    Alcohol use: Yes     Alcohol/week: 4.0 standard drinks of alcohol     Types: 1 Glasses of wine, 3 Cans of beer per week    Drug use: Yes     Types: Marijuana    Sexual activity: Yes     Partners: Male     Birth control/protection: I.U.D.         Current Outpatient Medications:     buPROPion XL (WELLBUTRIN XL) 300 MG 24 hr tablet, TAKE 1 TABLET BY MOUTH DAILY, Disp: 90 tablet, Rfl: 1    cetirizine (zyrTEC) 10 MG tablet, TAKE 1 TABLET BY  "MOUTH EVERY DAY, Disp: 90 tablet, Rfl: 0    dicyclomine (BENTYL) 20 MG tablet, TAKE 1 TABLET BY MOUTH EVERY 6 HOURS AS NEEDED FOR CRAMPS, Disp: , Rfl:     estradiol (ESTRACE) 0.1 MG/GM vaginal cream, APPLY 1 GRAM VAGINALLY AS DIRECTED, Disp: , Rfl:     fluticasone (FLONASE) 50 MCG/ACT nasal spray, USE 2 SPRAYS IN EACH NOSTRIL DAILY AS DIRECTED BY PROVIDER, Disp: 48 g, Rfl: 1    folic acid (FOLVITE) 1 MG tablet, Take 1 tablet by mouth Daily., Disp: , Rfl:     levonorgestrel (MIRENA) 20 MCG/24HR IUD, MIRENA (52 MG) 20 MCG/24HR IUD, Disp: , Rfl:     Lysine HCl (l-lysine) 500 MG tablet tablet, Take 2 tablets by mouth Daily., Disp: , Rfl:     SUMAtriptan (Imitrex) 100 MG tablet, Take one tablet at onset of headache. May repeat dose one time in 2 hours if headache not relieved., Disp: 9 tablet, Rfl: 11    TURMERIC PO, Take  by mouth., Disp: , Rfl:     Review of Systems  A review of systems was performed, and the pertinent positives are noted in the HPI.    /85 (BP Location: Left arm, Patient Position: Sitting, Cuff Size: Large Adult)   Pulse 74   Temp 98 °F (36.7 °C) (Temporal)   Ht 165.1 cm (65\")   Wt 78.8 kg (173 lb 12.8 oz)   SpO2 97%   BMI 28.92 kg/m²   BMI is >= 25 and <30. (Overweight) The following options were offered after discussion;: exercise counseling/recommendations       Objective   Physical Exam  Vitals and nursing note reviewed.   Constitutional:       Appearance: Normal appearance. She is well-developed, well-groomed and overweight.   HENT:      Head: Normocephalic and atraumatic.      Right Ear: Tympanic membrane, ear canal and external ear normal.      Left Ear: Tympanic membrane, ear canal and external ear normal.      Nose: Nose normal.      Mouth/Throat:      Mouth: Mucous membranes are moist.      Pharynx: Oropharynx is clear.   Eyes:      Extraocular Movements: Extraocular movements intact.      Conjunctiva/sclera: Conjunctivae normal.      Pupils: Pupils are equal, round, and reactive " to light.   Neck:      Thyroid: No thyromegaly.      Vascular: No carotid bruit.   Cardiovascular:      Rate and Rhythm: Normal rate and regular rhythm.      Pulses: Normal pulses.      Heart sounds: Normal heart sounds.   Pulmonary:      Effort: Pulmonary effort is normal.      Breath sounds: Normal breath sounds.   Abdominal:      General: Abdomen is flat. Bowel sounds are normal.      Palpations: Abdomen is soft. There is no hepatomegaly, splenomegaly or mass.      Tenderness: There is no abdominal tenderness.      Hernia: No hernia is present.   Musculoskeletal:      Cervical back: Normal range of motion and neck supple.      Right lower leg: No edema.      Left lower leg: No edema.   Lymphadenopathy:      Cervical: No cervical adenopathy.      Upper Body:      Right upper body: No supraclavicular adenopathy.      Left upper body: No supraclavicular adenopathy.   Skin:     General: Skin is warm and dry.      Findings: No lesion or rash.   Neurological:      General: No focal deficit present.      Mental Status: She is alert.      Motor: Motor function is intact.      Deep Tendon Reflexes: Reflexes are normal and symmetric.   Psychiatric:         Attention and Perception: Attention normal.         Mood and Affect: Mood normal.         Behavior: Behavior is cooperative.           Assessment & Plan   Problems Addressed this Visit          Health Encounters    Encounter for general adult medical examination without abnormal findings - Primary    Relevant Orders    Comprehensive Metabolic Panel    Hemoglobin A1c    Lipid Panel     Other Visit Diagnoses       Need for hepatitis C screening test        Relevant Orders    Hepatitis C Antibody          Diagnoses         Codes Comments    Encounter for general adult medical examination without abnormal findings    -  Primary ICD-10-CM: Z00.00  ICD-9-CM: V70.9     Need for hepatitis C screening test     ICD-10-CM: Z11.59  ICD-9-CM: V73.89           Counseled on the need  for weight loss through increased exercise and improved diet  Labs ordered  Counseled on need for tdap and shingrix    1. Constipation.  The patient is advised to maintain adequate hydration, supplement her diet, and incorporate Colace into her regimen if necessary.               Transcribed from ambient dictation for Keturah Harp MD by Debora Gonzales.  04/05/24   13:43 EDT    Patient or patient representative verbalized consent to the visit recording.  I have personally performed the services described in this document as transcribed by the above individual, and it is both accurate and complete.

## 2024-04-05 NOTE — PATIENT INSTRUCTIONS
Keep working to lose weight through healthy eating and exercise.   Consider getting shingles vaccines at pharmacy, here or health dept

## 2024-05-27 RX ORDER — FLUTICASONE PROPIONATE 50 MCG
2 SPRAY, SUSPENSION (ML) NASAL DAILY
Qty: 48 G | Refills: 1 | Status: SHIPPED | OUTPATIENT
Start: 2024-05-27

## 2024-05-27 RX ORDER — BUPROPION HYDROCHLORIDE 300 MG/1
300 TABLET ORAL DAILY
Qty: 90 TABLET | Refills: 1 | Status: SHIPPED | OUTPATIENT
Start: 2024-05-27

## 2024-07-01 RX ORDER — CETIRIZINE HYDROCHLORIDE 10 MG/1
TABLET ORAL
Qty: 90 TABLET | Refills: 0 | Status: SHIPPED | OUTPATIENT
Start: 2024-07-01

## 2024-07-02 ENCOUNTER — OFFICE VISIT (OUTPATIENT)
Dept: FAMILY MEDICINE CLINIC | Facility: CLINIC | Age: 54
End: 2024-07-02
Payer: COMMERCIAL

## 2024-07-02 VITALS
BODY MASS INDEX: 29.29 KG/M2 | DIASTOLIC BLOOD PRESSURE: 87 MMHG | HEART RATE: 73 BPM | SYSTOLIC BLOOD PRESSURE: 138 MMHG | HEIGHT: 65 IN | OXYGEN SATURATION: 100 % | WEIGHT: 175.8 LBS | TEMPERATURE: 98.2 F

## 2024-07-02 DIAGNOSIS — M53.3 PAIN IN THE COCCYX: Primary | ICD-10-CM

## 2024-07-02 PROCEDURE — 99213 OFFICE O/P EST LOW 20 MIN: CPT | Performed by: NURSE PRACTITIONER

## 2024-07-02 RX ORDER — CHLORHEXIDINE GLUCONATE ORAL RINSE 1.2 MG/ML
SOLUTION DENTAL
COMMUNITY
Start: 2024-05-17

## 2024-07-02 RX ORDER — TERBINAFINE HYDROCHLORIDE 250 MG/1
1 TABLET ORAL DAILY
COMMUNITY
Start: 2024-06-19

## 2024-07-02 NOTE — PROGRESS NOTES
Chief Complaint  Tailbone Pain (Fell in March , slid and landed on tailbone, but recently has been hurting more )    Subjective        Opal Morales presents to [facility]  History of Present Illness  Opal is a 53-year-old female presenting today with complaints of tailbone pain.  She fell in March and landed directly on her tailbone. She experienced pain, but did not get it checked out. Recently the pain has increased. Occasionally her left leg will go numb. She is active and is not sedentary.       The following portions of the patient's history were reviewed and updated as appropriate: allergies, current medications, past family history, past medical history, past social history, past surgical history and problem list.    Allergies   Allergen Reactions    Codeine Nausea Only       Patient Active Problem List   Diagnosis    Anxiety    Encounter for general adult medical examination without abnormal findings    Fatigue    Gastroesophageal reflux disease    Hidrosis    Need for other prophylactic vaccination and inoculation against single diseases    Encounter for screening for other disorder    Swelling, limb    Syncope and collapse    Allergic rhinitis    Healthcare maintenance    Screening for colon cancer    Encounter for weight management    Diverticulitis of colon with perforation       Current Outpatient Medications   Medication Instructions    buPROPion XL (WELLBUTRIN XL) 300 mg, Oral, Daily    cetirizine (zyrTEC) 10 MG tablet TAKE 1 TABLET BY MOUTH EVERY DAY    chlorhexidine (PERIDEX) 0.12 % solution USE 1/2 OUNCE TO SWISH AND RINSE MOUTH TWICE DAILY AS DIRECTED    dicyclomine (BENTYL) 20 MG tablet TAKE 1 TABLET BY MOUTH EVERY 6 HOURS AS NEEDED FOR CRAMPS    estradiol (ESTRACE) 0.1 MG/GM vaginal cream APPLY 1 GRAM VAGINALLY AS DIRECTED    fluticasone (FLONASE) 50 MCG/ACT nasal spray 2 sprays, Each Nare, Daily    folic acid (FOLVITE) 1 mg, Oral, Daily    l-lysine 1,000 mg,  "Oral, Daily    levonorgestrel (MIRENA) 20 MCG/24HR IUD MIRENA (52 MG) 20 MCG/24HR IUD    SUMAtriptan (Imitrex) 100 MG tablet Take one tablet at onset of headache. May repeat dose one time in 2 hours if headache not relieved.    terbinafine (lamiSIL) 250 MG tablet 1 tablet, Oral, Daily    TURMERIC PO Oral          Objective   Vital Signs:  /87 (BP Location: Left arm, Patient Position: Sitting, Cuff Size: Large Adult)   Pulse 73   Temp 98.2 °F (36.8 °C) (Temporal)   Ht 165.1 cm (65\")   Wt 79.7 kg (175 lb 12.8 oz)   SpO2 100%   BMI 29.25 kg/m²   Estimated body mass index is 29.25 kg/m² as calculated from the following:    Height as of this encounter: 165.1 cm (65\").    Weight as of this encounter: 79.7 kg (175 lb 12.8 oz).               Review of Systems   Constitutional:  Negative for activity change, chills, fatigue, fever and unexpected weight change.   Cardiovascular:  Negative for leg swelling.   Musculoskeletal:  Positive for arthralgias. Negative for back pain, gait problem, joint swelling, myalgias, neck pain and neck stiffness.   Neurological:  Negative for weakness.        Physical Exam  Constitutional:       Appearance: Normal appearance.   Cardiovascular:      Rate and Rhythm: Normal rate and regular rhythm.   Pulmonary:      Effort: Pulmonary effort is normal.      Breath sounds: Normal breath sounds.   Musculoskeletal:         General: Normal range of motion.        Back:    Neurological:      Mental Status: She is alert and oriented to person, place, and time.   Psychiatric:         Mood and Affect: Mood normal.         Behavior: Behavior normal.         Thought Content: Thought content normal.         Judgment: Judgment normal.        Result Review :                   Assessment and Plan   Diagnoses and all orders for this visit:    1. Pain in the coccyx (Primary)  -     XR Sacrum & Coccyx; Future             Follow Up   No follow-ups on file.  Patient was given instructions and counseling " regarding her condition or for health maintenance advice. Please see specific information pulled into the AVS if appropriate.

## 2024-10-02 NOTE — PROGRESS NOTES
Subjective   Opal Morales is a 54 y.o. female.       HPI   Pt is here today to discuss a possible sleep study.  Recently has had to have several dental procedures; spoke with dentist who thinks she is clenching her jaws during the night.    They discussed treatment options for this one of which is a mouth guard.  The dentist told her she would need a sleep study prior to getting a mouth guard.    Pt says she is known to snore and she does have daytime fatigue.  She feels she sleeps thru the night.         The following portions of the patient's history were reviewed and updated as appropriate: allergies, current medications, past family history, past medical history, past social history, past surgical history, and problem list.    Review of Systems   Constitutional:  Positive for fatigue. Negative for chills and fever.   Respiratory:  Negative for cough, shortness of breath and wheezing.    Cardiovascular:  Negative for chest pain and palpitations.   Neurological:  Negative for dizziness, weakness, light-headedness and headache.   Psychiatric/Behavioral:  Positive for sleep disturbance. Negative for depressed mood. The patient is not nervous/anxious.        Objective   Physical Exam  Vitals reviewed.   Constitutional:       General: She is not in acute distress.     Appearance: Normal appearance.   HENT:      Head: Normocephalic and atraumatic.   Cardiovascular:      Rate and Rhythm: Normal rate and regular rhythm.      Pulses: Normal pulses.      Heart sounds: Normal heart sounds. No murmur heard.  Pulmonary:      Effort: Pulmonary effort is normal. No respiratory distress.      Breath sounds: Normal breath sounds. No wheezing or rhonchi.   Chest:      Chest wall: No tenderness.   Skin:     General: Skin is warm and dry.      Findings: No erythema.   Neurological:      General: No focal deficit present.      Mental Status: She is alert and oriented to person, place, and time.   Psychiatric:         Mood and  Affect: Mood normal.                  Procedures   Assessment & Plan   Diagnoses and all orders for this visit:    1. Bruxism (teeth grinding) (Primary)  Comments:  Referral to Sleep Med given  Orders:  -     Ambulatory Referral to Sleep Medicine    2. Snoring  Comments:  Referral to Sleep Med given  Orders:  -     Ambulatory Referral to Sleep Medicine    3. Fatigue, unspecified type  Comments:  Referral to Sleep Med given  Orders:  -     Ambulatory Referral to Sleep Medicine

## 2024-10-04 ENCOUNTER — OFFICE VISIT (OUTPATIENT)
Dept: FAMILY MEDICINE CLINIC | Facility: CLINIC | Age: 54
End: 2024-10-04
Payer: COMMERCIAL

## 2024-10-04 VITALS
BODY MASS INDEX: 28.82 KG/M2 | SYSTOLIC BLOOD PRESSURE: 140 MMHG | WEIGHT: 173 LBS | DIASTOLIC BLOOD PRESSURE: 78 MMHG | HEIGHT: 65 IN | OXYGEN SATURATION: 96 % | HEART RATE: 80 BPM

## 2024-10-04 DIAGNOSIS — F45.8 BRUXISM (TEETH GRINDING): Primary | ICD-10-CM

## 2024-10-04 DIAGNOSIS — R53.83 FATIGUE, UNSPECIFIED TYPE: ICD-10-CM

## 2024-10-04 DIAGNOSIS — R06.83 SNORING: ICD-10-CM

## 2024-10-04 PROCEDURE — 99213 OFFICE O/P EST LOW 20 MIN: CPT | Performed by: NURSE PRACTITIONER

## 2024-10-06 RX ORDER — CETIRIZINE HYDROCHLORIDE 10 MG/1
TABLET ORAL
Qty: 90 TABLET | Refills: 0 | Status: SHIPPED | OUTPATIENT
Start: 2024-10-06

## 2024-12-05 RX ORDER — BUPROPION HYDROCHLORIDE 300 MG/1
300 TABLET ORAL DAILY
Qty: 90 TABLET | Refills: 1 | Status: SHIPPED | OUTPATIENT
Start: 2024-12-05

## 2025-01-02 RX ORDER — CETIRIZINE HYDROCHLORIDE 10 MG/1
TABLET ORAL
Qty: 90 TABLET | Refills: 0 | Status: SHIPPED | OUTPATIENT
Start: 2025-01-02

## 2025-04-05 RX ORDER — CETIRIZINE HYDROCHLORIDE 10 MG/1
10 TABLET ORAL DAILY
Qty: 90 TABLET | Refills: 0 | Status: SHIPPED | OUTPATIENT
Start: 2025-04-05
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
## 2025-04-09 ENCOUNTER — LAB (OUTPATIENT)
Dept: FAMILY MEDICINE CLINIC | Facility: CLINIC | Age: 55
End: 2025-04-09
Payer: COMMERCIAL

## 2025-04-09 ENCOUNTER — OFFICE VISIT (OUTPATIENT)
Dept: FAMILY MEDICINE CLINIC | Facility: CLINIC | Age: 55
End: 2025-04-09
Payer: COMMERCIAL

## 2025-04-09 VITALS
OXYGEN SATURATION: 100 % | BODY MASS INDEX: 29.16 KG/M2 | HEIGHT: 65 IN | HEART RATE: 87 BPM | WEIGHT: 175 LBS | SYSTOLIC BLOOD PRESSURE: 124 MMHG | DIASTOLIC BLOOD PRESSURE: 78 MMHG

## 2025-04-09 DIAGNOSIS — Z00.00 ENCOUNTER FOR GENERAL ADULT MEDICAL EXAMINATION WITHOUT ABNORMAL FINDINGS: ICD-10-CM

## 2025-04-09 DIAGNOSIS — L98.9 SKIN LESION: ICD-10-CM

## 2025-04-09 DIAGNOSIS — Z00.00 ENCOUNTER FOR GENERAL ADULT MEDICAL EXAMINATION WITHOUT ABNORMAL FINDINGS: Primary | ICD-10-CM

## 2025-04-09 DIAGNOSIS — Z23 NEED FOR SHINGLES VACCINE: ICD-10-CM

## 2025-04-09 LAB
ALBUMIN SERPL-MCNC: 4.4 G/DL (ref 3.5–5.2)
ALBUMIN/GLOB SERPL: 1.4 G/DL
ALP SERPL-CCNC: 73 U/L (ref 39–117)
ALT SERPL W P-5'-P-CCNC: 37 U/L (ref 1–33)
ANION GAP SERPL CALCULATED.3IONS-SCNC: 9.1 MMOL/L (ref 5–15)
AST SERPL-CCNC: 20 U/L (ref 1–32)
BILIRUB SERPL-MCNC: 0.4 MG/DL (ref 0–1.2)
BUN SERPL-MCNC: 10 MG/DL (ref 6–20)
BUN/CREAT SERPL: 14.1 (ref 7–25)
CALCIUM SPEC-SCNC: 9.7 MG/DL (ref 8.6–10.5)
CHLORIDE SERPL-SCNC: 104 MMOL/L (ref 98–107)
CHOLEST SERPL-MCNC: 168 MG/DL (ref 0–200)
CO2 SERPL-SCNC: 26.9 MMOL/L (ref 22–29)
CREAT SERPL-MCNC: 0.71 MG/DL (ref 0.57–1)
EGFRCR SERPLBLD CKD-EPI 2021: 100.6 ML/MIN/1.73
GLOBULIN UR ELPH-MCNC: 3.2 GM/DL
GLUCOSE SERPL-MCNC: 84 MG/DL (ref 65–99)
HBA1C MFR BLD: 5.2 % (ref 4.8–5.6)
HDLC SERPL-MCNC: 47 MG/DL (ref 40–60)
LDLC SERPL CALC-MCNC: 91 MG/DL (ref 0–100)
LDLC/HDLC SERPL: 1.82 {RATIO}
POTASSIUM SERPL-SCNC: 4.1 MMOL/L (ref 3.5–5.2)
PROT SERPL-MCNC: 7.6 G/DL (ref 6–8.5)
SODIUM SERPL-SCNC: 140 MMOL/L (ref 136–145)
TRIGL SERPL-MCNC: 177 MG/DL (ref 0–150)
VLDLC SERPL-MCNC: 30 MG/DL (ref 5–40)

## 2025-04-09 PROCEDURE — 90750 HZV VACC RECOMBINANT IM: CPT | Performed by: FAMILY MEDICINE

## 2025-04-09 PROCEDURE — 80053 COMPREHEN METABOLIC PANEL: CPT | Performed by: FAMILY MEDICINE

## 2025-04-09 PROCEDURE — 99396 PREV VISIT EST AGE 40-64: CPT | Performed by: FAMILY MEDICINE

## 2025-04-09 PROCEDURE — 83036 HEMOGLOBIN GLYCOSYLATED A1C: CPT | Performed by: FAMILY MEDICINE

## 2025-04-09 PROCEDURE — 80061 LIPID PANEL: CPT | Performed by: FAMILY MEDICINE

## 2025-04-09 PROCEDURE — 36415 COLL VENOUS BLD VENIPUNCTURE: CPT

## 2025-04-09 RX ORDER — MULTIPLE VITAMINS W/ MINERALS TAB 9MG-400MCG
1 TAB ORAL 2 TIMES DAILY
COMMUNITY

## 2025-04-09 NOTE — PATIENT INSTRUCTIONS
Keep working to lose weight through healthy eating and exercise.   Come back in 2-6 months for the 2nd shingles vaccine  Can get your pneumonia vaccine in 2-3 weeks

## 2025-04-23 ENCOUNTER — CLINICAL SUPPORT (OUTPATIENT)
Dept: FAMILY MEDICINE CLINIC | Facility: CLINIC | Age: 55
End: 2025-04-23
Payer: COMMERCIAL

## 2025-04-23 DIAGNOSIS — Z23 NEED FOR VACCINATION WITH 20-POLYVALENT PNEUMOCOCCAL CONJUGATE VACCINE: Primary | ICD-10-CM

## 2025-05-13 RX ORDER — BUPROPION HYDROCHLORIDE 300 MG/1
300 TABLET ORAL DAILY
Qty: 90 TABLET | Refills: 1 | Status: SHIPPED | OUTPATIENT
Start: 2025-05-13

## 2025-05-22 ENCOUNTER — OFFICE VISIT (OUTPATIENT)
Dept: SLEEP MEDICINE | Facility: CLINIC | Age: 55
End: 2025-05-22
Payer: COMMERCIAL

## 2025-05-22 VITALS
HEART RATE: 75 BPM | DIASTOLIC BLOOD PRESSURE: 85 MMHG | BODY MASS INDEX: 28.82 KG/M2 | WEIGHT: 173 LBS | HEIGHT: 65 IN | SYSTOLIC BLOOD PRESSURE: 137 MMHG | OXYGEN SATURATION: 98 %

## 2025-05-22 DIAGNOSIS — R06.83 SNORING: ICD-10-CM

## 2025-05-22 DIAGNOSIS — G47.63 SLEEP-RELATED BRUXISM: ICD-10-CM

## 2025-05-22 DIAGNOSIS — R29.818 SUSPECTED SLEEP APNEA: Primary | ICD-10-CM

## 2025-05-22 NOTE — PROGRESS NOTES
[facility]  [address] [place] [id number]  Phone   Fax      Opal Morales  [id number]   1970  54 y.o.  female      Referring Provider: MARCIE Koo  PCP: Keturah Harp MD    Type of service: Initial Sleep Medicine Consult  Date of service: 5/22/2025          CHIEF COMPLAINT: Snoring, suspected sleep apnea      HISTORY OF PRESENT ILLNESS:  Opal Morales 54 y.o. was seen today on 5/22/2025 at [facility].    Patient denies significant past medical history.  Patient has no history of tonsillectomy, adenoidectomy, nasal surgery, UPPP.   Patient presents today to be evaluated for possible sleep apnea.  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  She is following with [facility] dentistry and going to be getting some dental work and then looking into possible nightguard but was advised to be evaluated for sleep apnea first.  Patient's  has sleep apnea, on PAP device.  Patient does have history of loud snoring.  Has woken up gasping for breath at times, maybe a handful of times.  Has had sleep paralysis in the past as well.    We discussed CPAP therapy is generally still being considered gold standard of treatment for sleep apnea, however if sleep apnea mild to moderate may be candidate for oral mandibular advancement device.  Denies TMJ jaw issues.  Would need to discuss candidacy further with dentist to ensure no prohibitive dental factors in place.  We did discuss that oral mandibular advancement device may not be as effective as CPAP though may be an effective option for mild to moderate sleep apnea.  We discussed that on average this may reduce apneic episodes by about 50%.  We discussed that follow-up sleep study would be needed with appliance to determine effectiveness if she does go this route.  Also discussed that I am happy to see the patient back in the office after we have her  sleep study results, especially if sleep apnea present, to discuss treatment options in more detail.  If sleep study negative then we discussed office follow-up to ensure additional testing not indicated.  Patient verbalized understanding of plan and recommendations.        SLEEP HISTORY:  Sleep schedule:  Bedtime: 10 PM weeknights, 11 PM weekends  Wake time: 7 AM  Time it takes to fall asleep: 5 to 10 minutes  Average hours of sleep: 8-9  Number of naps per day: 0    Symptoms:   In addition to the above, patient reports the following associated symptoms:  Have you ever awakened gasping for breath, coughing, choking: Yes   Change in weight:  No   Morning headaches:  No   Awaken with a sore throat or dry mouth:  Yes   Leg jerking at night:  No   Creepy crawly feeling in legs/urge to move legs: Yes   Teeth grinding: Yes, following with [facility]  Have you ever awakened at night with a sour taste or burning sensation in your chest:  No   Do you have muscle weakness with laughing or anger:  No   Have you ever felt paralyzed while going to sleep or waking up:  Yes   Sleepwalking: No   Nightmares: No   Nocturia (urination at night): 0 times per night  Memory Problem: No     Medical Conditions (PMH):   Nocturnal bruxism    Social history:  Do you drive a commercial vehicle:  No   Shift work:  No   Tobacco use:  No   Alcohol use: 3-4 per week  Caffeinated drinks: 1 per day  Drug use: No  Occupation:  self-employed    Family History (parents and siblings) (pertaining to sleep medicine):  Hypertension  Diabetes  Obesity  Sleep apnea  Breast cancer    Medications: reviewed    Allergies:  Codeine      REVIEW OF SYSTEMS:  Pertinent positive symptoms are:  Snoring  [place] Sleepiness Scale of Total score: 3   Fatigue  Nasal congestion        PHYSICAL EXAM:  CONSTITUTINONAL:   Vitals:    05/22/25 0900   BP: 137/85   BP Location: Left arm   Patient Position: Sitting   Pulse: 75   SpO2: 98%   Weight: 78.5 kg (173 lb)  "  Height: 165.1 cm (65\")    Body mass index is 28.79 kg/m².   HEAD: atraumatic, normocephalic   THROAT: tonsils are not significant, Mallampati class III-IV  NECK: Neck Circumference: 15 inches, trachea is midline  RESPIRATORY SYSTEM: Respirations even, unlabored, normal rate  CARDIOVASULAR SYSTEM: Normal rate, no edema   NEUROLOGICAL SYSTEM: Alert and oriented x 3  PSYCHIATRIC SYSTEM: Mood is normal/ appropriate     Office note(s) from care team reviewed. Office note(s) reviewed: 10/2024 family medicine note    Labs/ Test Results Reviewed:     Most Recent A1C          4/9/2025    11:24   HGBA1C Most Recent   Hemoglobin A1C 5.20    4/2025 CMP reviewed.  CO2 level within normal limits, 26.9.  Creatinine within normal limits, 0.71.          ASSESSMENT AND PLAN:   Suspected sleep apnea: patient's symptoms and physical examination are concerning for possible sleep apnea.   I discussed the signs, symptoms, and pathophysiology of sleep apnea with this patient.  I also discussed the possible complications of untreated sleep apnea including but not limited to potential risk of resistant hypertension, insulin resistance, pulmonary hypertension, atrial fibrillation or other arrhythmias, heart attack, stroke, nonrestorative sleep with hypersomnia which can increase risk for motor vehicle accidents, etc.   Different testing methods including home-based and lab based sleep studies were discussed with this patient.   Based on patient history and physical examination, will proceed with home sleep study.  The order for the sleep study is placed in [facility].  The test will be scheduled after prior authorization has been obtained through patient's insurance.  Discussed overview of treatment options for sleep apnea in the office today including PAP therapy and oral mandibular advancement device\, and treatment/ management will be discussed in more detail with this patient after the test is completed.  All questions were answered to " patient's satisfaction.   Snoring: snoring is the sound created by turbulent airflow vibrating upper airway soft tissue.  I have also discussed factors affecting snoring including sleep deprivation, sleeping on the back and alcohol ingestion. To minimize snoring, patient is advised to have adequate sleep, sleep on their side, and avoid alcohol and sedative medications around bedtime. Do not drive, operate heavy machinery, or do activities that require high concentration if feeling tired/drowsy.  Overweight: Body mass index is 28.79 kg/m².. Patients who are overweight or obese are at increased risk of sleep apnea/ sleep disordered breathing. Weight reduction and healthy lifestyle are encouraged in overweight/ obese patients as part of a comprehensive approach to sleep apnea treatment.   Sleep apnea may be more likely to occur at higher weights and/or may be more severe at higher weights, some sleep apnea may not be weight related.  Therefore we would never want to assume sleep apnea resolves with weight loss, however if there is a weight decrease in the future with improved symptoms could consider retesting at that point if indicated.  Nocturnal bruxism: Patient needing a sleep study prior to getting oral appliance to address bruxism.  Following with [facility].    I have also discussed with the patient the following  Sleep hygiene: try to maintain a regular bed time and wake time, avoid watching TV/ using electronic devices in bed (including cell phones), limit caffeinated and alcoholic beverages before bed, try to maintain a cool and quiet sleep environment, avoid daytime naps  Adequate amount of sleep: most people need around 7 to 9 hours of sleep each night        Patient will follow-up after study, 31 to 90 days after PAP therapy initiated if applicable, or contact the office sooner for questions or concerns. Patient's questions were answered.            Thank you again for asking me to consult on this  patient.  Please do not hesitate to call me if you have additional questions or concerns.       Shelbi Prieto DNP, APRN  [facility]

## 2025-05-29 ENCOUNTER — OFFICE (OUTPATIENT)
Dept: URBAN - METROPOLITAN AREA CLINIC 64 | Facility: CLINIC | Age: 55
End: 2025-05-29
Payer: COMMERCIAL

## 2025-05-29 VITALS — HEIGHT: 65 IN | DIASTOLIC BLOOD PRESSURE: 116 MMHG | WEIGHT: 172 LBS | SYSTOLIC BLOOD PRESSURE: 168 MMHG

## 2025-05-29 DIAGNOSIS — R10.32 LEFT LOWER QUADRANT PAIN: ICD-10-CM

## 2025-05-29 DIAGNOSIS — Z87.19 PERSONAL HISTORY OF OTHER DISEASES OF THE DIGESTIVE SYSTEM: ICD-10-CM

## 2025-05-29 DIAGNOSIS — K76.0 FATTY (CHANGE OF) LIVER, NOT ELSEWHERE CLASSIFIED: ICD-10-CM

## 2025-05-29 DIAGNOSIS — Z86.0101 PERSONAL HISTORY OF ADENOMATOUS AND SERRATED COLON POLYPS: ICD-10-CM

## 2025-05-29 DIAGNOSIS — K59.00 CONSTIPATION, UNSPECIFIED: ICD-10-CM

## 2025-05-29 PROCEDURE — 99214 OFFICE O/P EST MOD 30 MIN: CPT

## 2025-07-04 RX ORDER — CETIRIZINE HYDROCHLORIDE 10 MG/1
10 TABLET ORAL DAILY
Qty: 90 TABLET | Refills: 0 | Status: SHIPPED | OUTPATIENT
Start: 2025-07-04

## 2025-07-09 ENCOUNTER — CLINICAL SUPPORT (OUTPATIENT)
Dept: FAMILY MEDICINE CLINIC | Facility: CLINIC | Age: 55
End: 2025-07-09
Payer: COMMERCIAL

## 2025-07-09 DIAGNOSIS — Z23 IMMUNIZATION DUE: Primary | ICD-10-CM

## 2025-07-09 PROCEDURE — 90750 HZV VACC RECOMBINANT IM: CPT | Performed by: FAMILY MEDICINE
